# Patient Record
Sex: MALE | Race: WHITE | Employment: FULL TIME | URBAN - METROPOLITAN AREA
[De-identification: names, ages, dates, MRNs, and addresses within clinical notes are randomized per-mention and may not be internally consistent; named-entity substitution may affect disease eponyms.]

---

## 2017-02-18 ENCOUNTER — RADIANT APPOINTMENT (OUTPATIENT)
Dept: GENERAL RADIOLOGY | Facility: CLINIC | Age: 42
End: 2017-02-18
Attending: INTERNAL MEDICINE
Payer: COMMERCIAL

## 2017-02-18 ENCOUNTER — OFFICE VISIT (OUTPATIENT)
Dept: URGENT CARE | Facility: URGENT CARE | Age: 42
End: 2017-02-18
Payer: COMMERCIAL

## 2017-02-18 VITALS
BODY MASS INDEX: 34.72 KG/M2 | DIASTOLIC BLOOD PRESSURE: 98 MMHG | TEMPERATURE: 98.9 F | HEIGHT: 71 IN | WEIGHT: 248 LBS | OXYGEN SATURATION: 95 % | HEART RATE: 92 BPM | SYSTOLIC BLOOD PRESSURE: 140 MMHG

## 2017-02-18 DIAGNOSIS — R05.9 COUGH: ICD-10-CM

## 2017-02-18 DIAGNOSIS — R05.9 COUGH: Primary | ICD-10-CM

## 2017-02-18 LAB
FLUAV+FLUBV AG SPEC QL: NORMAL
FLUAV+FLUBV AG SPEC QL: NORMAL
SPECIMEN SOURCE: NORMAL

## 2017-02-18 PROCEDURE — 87804 INFLUENZA ASSAY W/OPTIC: CPT | Performed by: INTERNAL MEDICINE

## 2017-02-18 PROCEDURE — 99213 OFFICE O/P EST LOW 20 MIN: CPT | Performed by: INTERNAL MEDICINE

## 2017-02-18 PROCEDURE — 71020 XR CHEST 2 VW: CPT

## 2017-02-18 RX ORDER — DOXYCYCLINE 100 MG/1
100 CAPSULE ORAL 2 TIMES DAILY
Qty: 20 CAPSULE | Refills: 0 | Status: SHIPPED | OUTPATIENT
Start: 2017-02-18 | End: 2017-07-17

## 2017-02-18 NOTE — MR AVS SNAPSHOT
"              After Visit Summary   2/18/2017    Wilfrid Vickers    MRN: [id number]           Patient Information     Date Of Birth          1975        Visit Information        Provider Department      2/18/2017 5:15 PM Tomas Calvillo MD [facility]        Today's Diagnoses     Cough    -  1       Follow-ups after your visit        Who to contact     If you have questions or need follow up information about today's clinic visit or your schedule please contact [facility] directly at [phone].  Normal or non-critical lab and imaging results will be communicated to you by DHgatehart, letter or phone within 4 business days after the clinic has received the results. If you do not hear from us within 7 days, please contact the clinic through Nordic Rivert or phone. If you have a critical or abnormal lab result, we will notify you by phone as soon as possible.  Submit refill requests through PartyLine or call your pharmacy and they will forward the refill request to us. Please allow 3 business days for your refill to be completed.          Additional Information About Your Visit        MyChart Information     PartyLine gives you secure access to your electronic health record. If you see a primary care provider, you can also send messages to your care team and make appointments. If you have questions, please call your primary care clinic.  If you do not have a primary care provider, please call [phone] and they will assist you.        Care EveryWhere ID     This is your Care EveryWhere ID. This could be used by other organizations to access your [place] medical records  [id number]        Your Vitals Were     Pulse Temperature Height Pulse Oximetry BMI (Body Mass Index)       92 98.9  F (37.2  C) (Oral) 5' 10.5\" (1.791 m) 95% 35.08 kg/m2        Blood Pressure from Last 3 Encounters:   02/18/17 (!) 140/98   09/22/16 (!) 160/106   09/15/16 136/86    Weight from Last 3 " Encounters:   02/18/17 248 lb (112.5 kg)   09/22/16 242 lb (109.8 kg)   09/15/16 247 lb 8 oz (112.3 kg)              We Performed the Following     Influenza A/B antigen          Today's Medication Changes          These changes are accurate as of: 2/18/17 11:59 PM.  If you have any questions, ask your nurse or doctor.               Start taking these medicines.        Dose/Directions    doxycycline 100 MG capsule   Commonly known as:  VIBRAMYCIN   Used for:  Cough   Started by:  Tomas Calvillo MD        Dose:  100 mg   Take 1 capsule (100 mg) by mouth 2 times daily   Quantity:  20 capsule   Refills:  0            Where to get your medicines      These medications were sent to [facility] [address] [place] [address] AT [address] [address]    Hours:  24-hours Phone:  [phone]     doxycycline 100 MG capsule                Primary Care Provider Office Phone # Fax #    Jac Donta Brewster [phone]       [facility] [address] [facility] [address]        Thank you!     Thank you for choosing [facility]  for your care. Our goal is always to provide you with excellent care. Hearing back from our patients is one way we can continue to improve our services. Please take a few minutes to complete the written survey that you may receive in the mail after your visit with us. Thank you!             Your Updated Medication List - Protect others around you: Learn how to safely use, store and throw away your medicines at www.disposemymeds.org.          This list is accurate as of: 2/18/17 11:59 PM.  Always use your most recent med list.                   Brand Name Dispense Instructions for use    dextromethorphan 15 MG/5ML syrup      Take 10 mLs by mouth 4 times daily as needed for cough       doxycycline 100 MG capsule    VIBRAMYCIN    20 capsule    Take 1 capsule (100 mg) by  mouth 2 times daily       zolpidem 10 MG tablet    AMBIEN    1 tablet    Take tablet by mouth 15 minutes prior to sleep, for Sleep Study

## 2017-02-18 NOTE — NURSING NOTE
"Chief Complaint   Patient presents with     Urgent Care     Cough     Hx of frequent pneumonia when he was young.  5 days ago started to have body aches and congested nonproductive cough, now hearing wheezing and gurgling in his lungs, cough keeping him awake at night.  Reports diaphragm hurts because he's coughing so hard.  Today has felt feverish, had chills/sweats.     Initial BP (!) 140/98 (BP Location: Left arm, Cuff Size: Adult Large)  Pulse 92  Temp 98.9  F (37.2  C) (Oral)  Ht 5' 10.5\" (1.791 m)  Wt 248 lb (112.5 kg)  SpO2 95%  BMI 35.08 kg/m2 Estimated body mass index is 35.08 kg/(m^2) as calculated from the following:    Height as of this encounter: 5' 10.5\" (1.791 m).    Weight as of this encounter: 248 lb (112.5 kg)..  BP completed using cuff size: large  GLORIA Espitia  "

## 2017-02-21 NOTE — PROGRESS NOTES
"[facility]gent Care Progress Note        Tomas Calvillo MD, MPH  2-        History:      Wilfrid Vickers is a pleasant 41 year old male with a chief complaint of cough w scant amount of yellowish sputum and body aches x 5 days.   No dyspnea or chest pain.   No smoking history.   No headache or neck pain.  No GI or  symptoms.   No rash         Assessment and Plan:          - Influenza A/B antigen: negative  - XR Chest 2 Views: right upper lobe infiltrate.  Right upper lobe pneumonia:  - doxycycline (VIBRAMYCIN) 100 MG capsule; Take 1 capsule (100 mg) by mouth 2 times daily  Dispense: 20 capsule; Refill: 0  Discussed the adverse effects of this medication such as pigmentary changes of skin and lip and oral mucosa and advised to drink plenty of fluids and avoid laying down for an hour after ingesting doxycycline due to esophageal irritation that may ensue.  Discussed supportive care with the patient.  Advised to increase fluid intake and rest.  Tylenol 650 mg po for pain and fever q 6 hours prn  F/u w PCP in 1 week, earlier if symptoms worsen.  Discussed the need for follow up CXR in his PCP office to make sure that pneumonia has resolved.                   Physical Exam:      BP (!) 140/98 (BP Location: Left arm, Cuff Size: Adult Large)  Pulse 92  Temp 98.9  F (37.2  C) (Oral)  Ht 5' 10.5\" (1.791 m)  Wt 248 lb (112.5 kg)  SpO2 95%  BMI 35.08 kg/m2     Constitutional: Patient is in mild distress The patient is pleasant and cooperative.   HEENT: Head:  Head is atraumatic, normocephalic.    Eyes: Pupils are equal, round and reactive to light and accomodation.  Sclera is non-icteric. No conjunctival injection, or exudate noted. Extraocular motion is intact. Visual acuity is intact bilaterally.  Ears:  External acoustic canals are patent and clear.  There is no erythema and bulging( exudate)  of the ( R/L ) tympanic membrane(s ).   Nose:   Nasal congestion w/o drainage or mucosal ulceration " is noted.  Throat:  Oral mucosa is moist.  No oral lesions are noted.   posterior pharyngeal hyperemia w/o exudate noted.     Neck Supple.  There is no cervical lymphadenopathy.  No nuchal rigidity noted.  There is no meningismus.     Cardiovascular: Heart is regular to rate and rhythm.  No murmur is noted.     Lungs: Crackle in right upper lung posteriorly. No respiratory distress or compromise. No wheezing noted on exam today.   Abdomen: Soft and non-tender.    Back No flank tenderness is noted.   Extremeties No edema, no calf tenderness.   Neuro: No focal deficit.   Skin No petechiae or purpura is noted.  There is no rash.   Mood Normal              Data:      All new lab and imaging data was reviewed.   Results for orders placed or performed in visit on 02/18/17   Influenza A/B antigen   Result Value Ref Range    Influenza A/B Agn Specimen Nasopharyngeal     Influenza A  NEG     Negative   Test results must be correlated with clinical data. If necessary, results   should be confirmed by a molecular assay or viral culture.      Influenza B  NEG     Negative   Test results must be correlated with clinical data. If necessary, results   should be confirmed by a molecular assay or viral culture.

## 2017-03-03 ENCOUNTER — TELEPHONE (OUTPATIENT)
Dept: NURSING | Facility: CLINIC | Age: 42
End: 2017-03-03

## 2017-03-03 ENCOUNTER — TELEPHONE (OUTPATIENT)
Dept: FAMILY MEDICINE | Facility: CLINIC | Age: 42
End: 2017-03-03

## 2017-03-03 DIAGNOSIS — R05.9 COUGH: ICD-10-CM

## 2017-03-03 RX ORDER — DOXYCYCLINE 100 MG/1
100 CAPSULE ORAL 2 TIMES DAILY
Qty: 20 CAPSULE | Refills: 0 | Status: CANCELLED | OUTPATIENT
Start: 2017-03-03

## 2017-03-03 NOTE — TELEPHONE ENCOUNTER
Call Type: Triage Call    Presenting Problem: weeks ago sick UC pneumonia and chest hurted  alot.  Took along time to get over feel self getting sick again,  needing another course.  I need refill of antibioitcs, tranferred to  RN at [facility].  pt was unhappy with 24 hour turn around time  of EPIC note so transferred pt to live Rn in clin.  Triage Note:  Guideline Title: Medication Questions - Adult  Recommended Disposition: Call Dispensing Pharmacy or Provider  Immediately  Original Inclination: Wanted to speak with a nurse  Override Disposition:  Intended Action: Follow advice given  Physician Contacted: No  Requests refill of prescribed medication that does NOT have a valid refill; lack  of medication may cause clinical risk to patient if not available. ?  YES  Sign(s) or symptom(s) associated with a diagnosed condition or with a new illness  ? NO  Prescription ordered today and not available at pharmacy putting patient at  clinical risk ? NO  Recurrence of a symptom(s) or illness post prescribed medication treatment AND  provider instructed patient to call if symptom(s) returned. ? NO  Unable to obtain prescribed medication related to available resources AND  situation poses immediate clinical risk ? NO  Pharmacy calling to clarify prescription order. ? NO  Physician Instructions:  Care Advice: Have pharmacy phone number and prescription information  available when you speak with provider.  Ask pharmacist if they can do a partial refill.

## 2017-03-04 NOTE — TELEPHONE ENCOUNTER
"Call Type: Triage Call    Presenting Problem: Note in EPIC from today:  Was seen in urgent care  Xray showed pneumonia  If not improving, needs visit to be  re-evaluated  May need a change in antibiotic  Tiffanie Martin MD  note from Tiffanie Martin  \"Pt very upset that he could not get a  refill over the phone, he was yelling and saying I was calling him a  liar.  I know she thinks she a doctor but she doesn't know more than  me.\"  Triage Note:  Guideline Title: Breathing Problems  Recommended Disposition: See ED Immediately  Original Inclination: Would have called clinic  Override Disposition:  Intended Action: Patient does not know  Physician Contacted: No  New or worsening shortness of breath/difficulty breathing AND new onset of  fatigue, weakness, confusion, or change in ability to care out daily activities ?  YES  New or worsening signs and symptoms that may indicate shock ? NO  Diabetes and breathing problems ? NO  Cough without breathing difficulty ? NO  Not breathing (no air movement from nose/mouth; no chest or abdomen movement) ? NO  Sudden onset of severe breathing difficulty ? NO  Known or suspected ingestion or inhalation of foreign object (grasping at throat,  unable to speak, high pitched noise when breathing in, unable to swallow) AND  object still lodged ? NO  Following blunt trauma or penetrating wound to chest, throat, neck or abdomen OR  change in shape of chest wall ? NO  New or worsening shortness of breath/difficulty breathing AND any other cardiac  signs/symptoms for more than 5 minutes, now or within last hour ? NO  Coughing, wheezing or shortness of breath continues after foreign body is cleared  (expelled) from airway ? NO  Currently having difficulty breathing after near drowning ? NO  Difficulty swallowing ? NO  Signs and symptoms of anaphylaxis ? NO  Physician Instructions:  Care Advice: Another adult should drive.  Write down provider's name. List or place the following in a bag " for  transport with the patient: current prescription and/or nonprescription  medications  alternative treatments, therapies and medications  and street drugs.  Place person in a position of comfort and loosen tight clothing.  An adult should stay with the patient, preferably one trained in CPR. If  the person is not trained in CPR, then he or she should provide hands-only  (compression-only) CPR as recommended by the American Heart Association.

## 2017-03-04 NOTE — TELEPHONE ENCOUNTER
"Call Type: Triage Call    Presenting Problem: Call  wants to talk to nurse \"who hung up on me\"  Advised  caller I would review EPIC and assist;  began a tirade that  he is not getting what he needs and wants regarding more antibiotics  for his pneumonia.  Reviewed EPIC  EMR and last provider notation  regarding his request.\"Was seen in urgent care  Xray showed  pneumonia  If not improving, needs visit to be re-evaluated  May  need a change in antibiotic  Tiffanie Martin MD    Offered to  contact on call provider and present request to that provider but  would not guarantee that provider would jeramy his request.  Did not  want to do this.  Continued tirade stating that  everyone he spoke  with was \"stupid\" and did not know what he knows and that [facility]  was after his money by making him be seen again.  Multiple attempts  made to explain need for  evaluation per previous triage encounters  and was unable to redirect. Tirade became loud and verbally abusive  and so call was terminated without notification to caller.  Triage Note:  Guideline Title: Information Only Call; No Symptom Triage (Adult)  Recommended Disposition: Provide Information or Advice Only  Original Inclination:  Override Disposition:  Intended Action:  Physician Contacted: No  Follow-up call to recent contact; no triage required. Information provided from  past call documentation, approved references or experience. ?  YES  Requesting regular office appointment ? NO  Sign(s) or symptom(s) associated with a diagnosed condition or with a new illness  ? NO  Requesting information about provider, services or community resources ? NO  Call back to complete assessment/clarification of information from prior caller to  complete triage ? NO  Requesting information and provider is best resource; no triage required. ? NO  Caller not with patient and is unable to provide clinical information about  patient to facilitate triage. ? NO  Requesting provider " information for recently scheduled test, procedure; no triage  required. Needed information not available per approved resources or clinical  experience. ? NO  Requesting information not available per approved reference or clinical  experience; no triage required. ? NO  Requesting information regarding scheduled exam, test or procedure; no triage  required. Information provided from approved resources or clinical experience. ?  NO  General information question; no triage required. Information provided from  approved references or knowledge of organization. ? NO  Health information question; person denies any symptoms, no triage required.  Information provided from approved references or clinical experience. ? NO  Physician Instructions:  Care Advice:

## 2017-06-06 ENCOUNTER — NURSE TRIAGE (OUTPATIENT)
Dept: NURSING | Facility: CLINIC | Age: 42
End: 2017-06-06

## 2017-06-06 NOTE — TELEPHONE ENCOUNTER
"  Reason for Disposition    [1] Age > 40 AND [2] no obvious cause AND [3] pain even when not moving the arm    (Exception: pain is clearly made worse by moving arm or bending neck)    Additional Information    Negative: Shock suspected (e.g., cold/pale/clammy skin, too weak to stand, low BP, rapid pulse)    Negative: [1] Similar pain previously AND [2] it was from \"heart attack\"    Negative: [1] Similar pain previously AND [2] it was from \"angina\" AND [3] not relieved by nitroglycerin    Negative: Sounds like a life-threatening emergency to the triager    Negative: Followed an arm injury    Negative: Chest pain    Negative: Pain, redness, or swelling at intravenous (IV) site or along course of vein    Negative: Wound looks infected    Negative: Elbow pain is main symptom    Negative: Wrist pain is main symptom    Negative: Difficulty breathing or unusual sweating (e.g., sweating without exertion)    Negative: [1] Age > 40 AND [2] associated chest or jaw pain AND [3] pain lasts > 5 minutes    Protocols used: ARM PAIN-ADULT-    "

## 2017-07-17 ENCOUNTER — OFFICE VISIT (OUTPATIENT)
Dept: FAMILY MEDICINE | Facility: CLINIC | Age: 42
End: 2017-07-17
Payer: COMMERCIAL

## 2017-07-17 VITALS
DIASTOLIC BLOOD PRESSURE: 100 MMHG | SYSTOLIC BLOOD PRESSURE: 144 MMHG | WEIGHT: 251 LBS | BODY MASS INDEX: 35.51 KG/M2 | OXYGEN SATURATION: 97 % | HEART RATE: 65 BPM | TEMPERATURE: 97.5 F

## 2017-07-17 DIAGNOSIS — L03.011 PARONYCHIA OF FINGER OF RIGHT HAND: Primary | ICD-10-CM

## 2017-07-17 DIAGNOSIS — R03.0 ELEVATED BLOOD PRESSURE READING WITHOUT DIAGNOSIS OF HYPERTENSION: ICD-10-CM

## 2017-07-17 PROCEDURE — 99214 OFFICE O/P EST MOD 30 MIN: CPT | Performed by: FAMILY MEDICINE

## 2017-07-17 RX ORDER — CIPROFLOXACIN 500 MG/1
500 TABLET, FILM COATED ORAL 2 TIMES DAILY
Qty: 20 TABLET | Refills: 0 | Status: SHIPPED | OUTPATIENT
Start: 2017-07-17 | End: 2017-07-28

## 2017-07-17 ASSESSMENT — PAIN SCALES - GENERAL: PAINLEVEL: SEVERE PAIN (7)

## 2017-07-17 NOTE — NURSING NOTE
"Chief Complaint   Patient presents with     Infection     right ring finger     Health Maintenance     lipid       Initial BP (!) 140/98 (BP Location: Left arm, Patient Position: Chair, Cuff Size: Adult Regular)  Pulse 65  Temp 97.5  F (36.4  C) (Oral)  Wt 251 lb (113.9 kg)  SpO2 97%  BMI 35.51 kg/m2 Estimated body mass index is 35.51 kg/(m^2) as calculated from the following:    Height as of 2/18/17: 5' 10.5\" (1.791 m).    Weight as of this encounter: 251 lb (113.9 kg).  Medication Reconciliation: complete   Luna See VERONICA Farrell      "

## 2017-07-17 NOTE — MR AVS SNAPSHOT
After Visit Summary   7/17/2017    Wilfrid Vickers    MRN: [id number]           Patient Information     Date Of Birth          1975        Visit Information        Provider Department      7/17/2017 11:00 AM Pepe Alva MD [facility]        Today's Diagnoses     Paronychia of finger of right hand    -  1    Elevated blood pressure reading without diagnosis of hypertension           Follow-ups after your visit        Follow-up notes from your care team     Return in about 3 months (around 10/17/2017) for BP Recheck.      Who to contact     If you have questions or need follow up information about today's clinic visit or your schedule please contact [facility] directly at [phone].  Normal or non-critical lab and imaging results will be communicated to you by Compressushart, letter or phone within 4 business days after the clinic has received the results. If you do not hear from us within 7 days, please contact the clinic through Compressushart or phone. If you have a critical or abnormal lab result, we will notify you by phone as soon as possible.  Submit refill requests through Nutraspace or call your pharmacy and they will forward the refill request to us. Please allow 3 business days for your refill to be completed.          Additional Information About Your Visit        MyChart Information     Nutraspace gives you secure access to your electronic health record. If you see a primary care provider, you can also send messages to your care team and make appointments. If you have questions, please call your primary care clinic.  If you do not have a primary care provider, please call [phone] and they will assist you.        Care EveryWhere ID     This is your Care EveryWhere ID. This could be used by other organizations to access your [place] medical records  [id number]        Your Vitals Were     Pulse Temperature Pulse Oximetry BMI (Body Mass  Index)          65 97.5  F (36.4  C) (Oral) 97% 35.51 kg/m2         Blood Pressure from Last 3 Encounters:   07/17/17 (!) 144/100   02/18/17 (!) 140/98   09/22/16 (!) 160/106    Weight from Last 3 Encounters:   07/17/17 251 lb (113.9 kg)   02/18/17 248 lb (112.5 kg)   09/22/16 242 lb (109.8 kg)              Today, you had the following     No orders found for display         Today's Medication Changes          These changes are accurate as of: 7/17/17 11:48 AM.  If you have any questions, ask your nurse or doctor.               Start taking these medicines.        Dose/Directions    ciprofloxacin 500 MG tablet   Commonly known as:  CIPRO   Used for:  Paronychia of finger of right hand   Started by:  Pepe Alva MD        Dose:  500 mg   Take 1 tablet (500 mg) by mouth 2 times daily   Quantity:  20 tablet   Refills:  0            Where to get your medicines      These medications were sent to [facility] [id number] [place] [address] [address] AT [facility] OF [address]  [address] [facility] [address]     Phone:  [phone]     ciprofloxacin 500 MG tablet                Primary Care Provider Office Phone # Fax #    Jac Donta Brewster [phone]       [facility] [address] [facility] [address]        Equal Access to Services     PERCY ROMERO AH: Hadii dayna rodgers hadasho Soomaali, waaxda luqadaha, qaybta kaalmada adeegyada, waxay jose alejandro santiago adesteve winn. So [facility] [phone].    ATENCIÓN: Si habla español, tiene a slade disposición servicios gratuitos de asistencia lingüística. Llame al [phone].    We comply with applicable federal civil rights laws and [place] laws. We do not discriminate on the basis of race, color, national origin, age, disability sex, sexual orientation or gender identity.            Thank you!     Thank you for choosing [facility]  for your care. Our goal is always to provide you  with excellent care. Hearing back from our patients is one way we can continue to improve our services. Please take a few minutes to complete the written survey that you may receive in the mail after your visit with us. Thank you!             Your Updated Medication List - Protect others around you: Learn how to safely use, store and throw away your medicines at www.disposemymeds.org.          This list is accurate as of: 7/17/17 11:48 AM.  Always use your most recent med list.                   Brand Name Dispense Instructions for use Diagnosis    ciprofloxacin 500 MG tablet    CIPRO    20 tablet    Take 1 tablet (500 mg) by mouth 2 times daily    Paronychia of finger of right hand

## 2017-07-17 NOTE — PROGRESS NOTES
SUBJECTIVE:                                                    Wilfrid Vickers is a 42 year old male who presents to clinic today for the following health issues:      Patient is here for a possible infection on his right ring finger. Patient had an ingrown nail about a week ago and he clipped it which made pus came out and now it is hurting.    Pain has been increasing   He now has pain in the pulp of the finger     O:BP (!) 144/100  Pulse 65  Temp 97.5  F (36.4  C) (Oral)  Wt 251 lb (113.9 kg)  SpO2 97%  BMI 35.51 kg/m2      No adenopathy     No lymphadenitis   No source to drain paronychia along the cuticle   Swelling and pain seem to deep in the pulp   Tender, but normal color and warmth     .Chest wall normal to inspection and palpation. Good excursion bilaterally. Lungs clear to auscultation. Good air movement bilaterally without rales, wheezes, or rhonchi.   Regular rate and  rhythm. S1 and S2 normal, no murmurs, clicks, gallops or rubs. No edema or JVD.      ICD-10-CM    1. Paronychia of finger of right hand L03.011 ciprofloxacin (CIPRO) 500 MG tablet   2. Elevated blood pressure reading without diagnosis of hypertension R03.0      Recheck bp in 3 mos   Discussed weight loss, salt reduction and increased exercise   If not improved start on antihypertensive med     Patient has swelling and redness of the ring finger on the right       Reviewed and updated as needed this visit by clinical staff  Tobacco  Allergies  Meds  Med Hx  Surg Hx  Fam Hx  Soc Hx      Reviewed and updated as needed this visit by Provider       \

## 2017-07-19 ENCOUNTER — TELEPHONE (OUTPATIENT)
Dept: FAMILY MEDICINE | Facility: CLINIC | Age: 42
End: 2017-07-19

## 2017-07-19 NOTE — TELEPHONE ENCOUNTER
Reason for Call:  Other     Detailed comments: Patient would like to discuss his infected finger that he had been seen for.    Phone Number Patient can be reached at: Home number on file [phone] (home)    Best Time:     Can we leave a detailed message on this number? Not Applicable    Call taken on 7/19/2017 at 9:50 AM by Cinda Yan

## 2017-07-19 NOTE — TELEPHONE ENCOUNTER
Called patient.  He was seen by Dr. Alva on 7/17/17 for an infected finger.  He has been on antibiotics now for a couple of days.  He stated that last night there were to large pockets noted they were causing him severe pain and pressure in his finger.  He stated that he could not handle the pain anymore, so he heated up a needle and cleaned it with hydrogen peroxide and then put a couple of pokes in the area.  He stated that it drained a lot of yellow pus out of his finger, which caused his pain to go down.  He is wondering if he should get his finger looked at again as he is afraid that the area will fill up with pus again.  He is using a bandage over the area at this point.    His finger is still red but the pain went down after he drained it. He denies any increased redness, pain, fevers, or chills.    Routed to covering provider to please advise on above.    Cris Marie RN [facility] Triage.

## 2017-07-19 NOTE — TELEPHONE ENCOUNTER
Notified patient of the message below per provider.  Appointment made for tomorrow for a recheck.  Advised patient if redness, increased pain and pus, fevers or chills to UC or ER today.  Cris Marie RN [facility] Triage.

## 2017-07-19 NOTE — TELEPHONE ENCOUNTER
If he can wait see Dr. DAY tomorrow or get seen at  if it is painful and draining pus.     Patsy Graham MD.   Family Physician.  [facility].

## 2017-07-20 ENCOUNTER — OFFICE VISIT (OUTPATIENT)
Dept: FAMILY MEDICINE | Facility: CLINIC | Age: 42
End: 2017-07-20
Payer: COMMERCIAL

## 2017-07-20 VITALS — WEIGHT: 251 LBS | BODY MASS INDEX: 35.51 KG/M2 | OXYGEN SATURATION: 98 % | HEART RATE: 67 BPM | TEMPERATURE: 97.6 F

## 2017-07-20 DIAGNOSIS — L03.011 PARONYCHIA OF FINGER, RIGHT: Primary | ICD-10-CM

## 2017-07-20 DIAGNOSIS — R03.0 ELEVATED BLOOD PRESSURE READING WITHOUT DIAGNOSIS OF HYPERTENSION: ICD-10-CM

## 2017-07-20 PROCEDURE — 99213 OFFICE O/P EST LOW 20 MIN: CPT | Performed by: FAMILY MEDICINE

## 2017-07-20 NOTE — MR AVS SNAPSHOT
After Visit Summary   7/20/2017    Wilfrid Vickers    MRN: [id number]           Patient Information     Date Of Birth          1975        Visit Information        Provider Department      7/20/2017 11:20 AM Pepe Alva MD [facility]         Follow-ups after your visit        Your next 10 appointments already scheduled     Oct 16, 2017  1:20 PM CDT   SHORT with Pepe Alva MD   [facility] ([facility]    [address] [place] [address] [phone]              Who to contact     If you have questions or need follow up information about today's clinic visit or your schedule please contact [facility] directly at [phone].  Normal or non-critical lab and imaging results will be communicated to you by MyChart, letter or phone within 4 business days after the clinic has received the results. If you do not hear from us within 7 days, please contact the clinic through MyChart or phone. If you have a critical or abnormal lab result, we will notify you by phone as soon as possible.  Submit refill requests through [employer] or call your pharmacy and they will forward the refill request to us. Please allow 3 business days for your refill to be completed.          Additional Information About Your Visit        MyChart Information     [employer] gives you secure access to your electronic health record. If you see a primary care provider, you can also send messages to your care team and make appointments. If you have questions, please call your primary care clinic.  If you do not have a primary care provider, please call [phone] and they will assist you.        Care EveryWhere ID     This is your Care EveryWhere ID. This could be used by other organizations to access your [place] medical records  [id number]        Your Vitals Were     Pulse Temperature Pulse  Oximetry BMI (Body Mass Index)          67 97.6  F (36.4  C) (Oral) 98% 35.51 kg/m2         Blood Pressure from Last 3 Encounters:   07/20/17 (!) (P) 150/100   07/17/17 (!) 144/100   02/18/17 (!) 140/98    Weight from Last 3 Encounters:   07/20/17 251 lb (113.9 kg)   07/17/17 251 lb (113.9 kg)   02/18/17 248 lb (112.5 kg)              Today, you had the following     No orders found for display       Primary Care Provider Office Phone # Fax #    Jac Donta Brewster [phone]       [facility] [address] [facility] [address]        Equal Access to Services     PERCY ROMERO : Hadii dayna sabillono Soaracelis, waaxda luqadaha, qaybta kaalmada adeegyada, ro mills . So [facility] [phone].    ATENCIÓN: Si habla español, tiene a slade disposición servicios gratuitos de asistencia lingüística. Llame al [phone].    We comply with applicable federal civil rights laws and [place] laws. We do not discriminate on the basis of race, color, national origin, age, disability sex, sexual orientation or gender identity.            Thank you!     Thank you for choosing [facility]  for your care. Our goal is always to provide you with excellent care. Hearing back from our patients is one way we can continue to improve our services. Please take a few minutes to complete the written survey that you may receive in the mail after your visit with us. Thank you!             Your Updated Medication List - Protect others around you: Learn how to safely use, store and throw away your medicines at www.disposemymeds.org.          This list is accurate as of: 7/20/17 12:06 PM.  Always use your most recent med list.                   Brand Name Dispense Instructions for use Diagnosis    ciprofloxacin 500 MG tablet    CIPRO    20 tablet    Take 1 tablet (500 mg) by mouth 2 times daily    Paronychia of finger of right hand

## 2017-07-20 NOTE — PROGRESS NOTES
SUBJECTIVE:                                                    Wilfrid Vickers is a 42 year old male who presents to clinic today for the following health issues:    Recheck right finger infection    Problem list and histories reviewed & adjusted, as indicated.      Additional history: as documented    Reviewed note from the doctor on call   Patient punctured the wound from the side of the finger with drainage of a large amount of pus     Current Outpatient Prescriptions   Medication Sig Dispense Refill     ciprofloxacin (CIPRO) 500 MG tablet Take 1 tablet (500 mg) by mouth 2 times daily 20 tablet 0     Allergies   Allergen Reactions     Amoxicillin      Rash     Erythromycin      Vomiting       Reviewed and updated as needed this visit by clinical staff  Tobacco  Allergies  Meds  Med Hx  Surg Hx  Fam Hx  Soc Hx      Reviewed and updated as needed this visit by Provider      O: BP (!) (P) 150/100  Pulse 67  Temp 97.6  F (36.4  C) (Oral)  Wt 251 lb (113.9 kg)  SpO2 98%  BMI 35.51 kg/m2    Pulp of the finger no longer tender   Slightly scabbed area on the  Medial portion of the finger         ICD-10-CM    1. Paronychia of finger, right L03.011    2. Elevated blood pressure reading without diagnosis of hypertension R03.0     no change in therapy for the paronychia   Handout regarding blood pressure   Follow up 3 months   If not better consider start of medication.   Patient could be a candidate for the pgen program

## 2017-07-20 NOTE — NURSING NOTE
"Chief Complaint   Patient presents with     RECHECK     Right Ring Finger       Initial Pulse 67  Temp 97.6  F (36.4  C) (Oral)  Wt 251 lb (113.9 kg)  SpO2 98%  BMI 35.51 kg/m2 Estimated body mass index is 35.51 kg/(m^2) as calculated from the following:    Height as of 2/18/17: 5' 10.5\" (1.791 m).    Weight as of this encounter: 251 lb (113.9 kg).  Medication Reconciliation: brie Zarate MA      "

## 2017-07-21 ENCOUNTER — TELEPHONE (OUTPATIENT)
Dept: FAMILY MEDICINE | Facility: CLINIC | Age: 42
End: 2017-07-21

## 2017-07-21 NOTE — TELEPHONE ENCOUNTER
"Called patient: patient states \"I shouldn't have called you it isn't important\" \"I am much better than I was 3 days ago\", \"Just want to start soaking my finger again\"   Admits has a small pocket of pus left on his finger - states was already seen for this in the clinic and was told he was on the mend. Encouraged patient to call clinic if streaks appear, is suddenly unable to bend or move finger, if finger becomes black and/or cold, patient states understands and will call if needed.     Anni Guo RN  [facility]      "

## 2017-07-21 NOTE — TELEPHONE ENCOUNTER
Reason for call:  Patient reporting a symptom    Symptom or request: Finger     Duration (how long have symptoms been present): Couple of days    Have you been treated for this before? Yes    Additional comments: He is wanting to speak with a nurse to see if he should soak his finger. He said that he still has the pocket of puss and to get the puss out he thinks that he needs to soak the finger. Please give him a call to let him know what to do.     Phone Number patient can be reached at:  Home number on file [phone] (home)    Best Time:  Anytime    Can we leave a detailed message on this number:  YES    Call taken on 7/21/2017 at 1:46 PM by Adrianna Huerta

## 2017-07-28 ENCOUNTER — OFFICE VISIT (OUTPATIENT)
Dept: FAMILY MEDICINE | Facility: CLINIC | Age: 42
End: 2017-07-28
Payer: COMMERCIAL

## 2017-07-28 VITALS
TEMPERATURE: 98.1 F | DIASTOLIC BLOOD PRESSURE: 93 MMHG | OXYGEN SATURATION: 99 % | BODY MASS INDEX: 35.36 KG/M2 | HEART RATE: 71 BPM | WEIGHT: 250 LBS | SYSTOLIC BLOOD PRESSURE: 134 MMHG

## 2017-07-28 DIAGNOSIS — L03.012 PARONYCHIA OF FINGER, LEFT: Primary | ICD-10-CM

## 2017-07-28 DIAGNOSIS — F19.982 DRUG INDUCED INSOMNIA (H): ICD-10-CM

## 2017-07-28 PROCEDURE — 99213 OFFICE O/P EST LOW 20 MIN: CPT | Performed by: FAMILY MEDICINE

## 2017-07-28 NOTE — NURSING NOTE
"Chief Complaint   Patient presents with     Finger     Follow up right finger infection       Initial BP (!) 134/93  Pulse 71  Temp 98.1  F (36.7  C) (Oral)  Wt 250 lb (113.4 kg)  SpO2 99%  BMI 35.36 kg/m2 Estimated body mass index is 35.36 kg/(m^2) as calculated from the following:    Height as of 2/18/17: 5' 10.5\" (1.791 m).    Weight as of this encounter: 250 lb (113.4 kg).  Medication Reconciliation: complete   Yun Zarate MA      "

## 2017-07-28 NOTE — PROGRESS NOTES
"  SUBJECTIVE:                                                    Wilfrid Vickers is a 42 year old male who presents to clinic today for the following health issues:      Recheck right finger infection    He still had a pocket of pus present on Monday 4 days ago but now that is gone   He states he stayed on the antibiotic and now it seems like the infection is gone     Patient states he has been having trouble sleeping because he stopped marijuana   He was using a lot of it   He wants a sleeping pill to help him sleep for the next several days     He states he can get off of things without a problem   He had a history of methamphetamine abuse in the past and he stopped that (pt came in and talked to me about this when he saw this in his chart.  He disputes that he said this and since it was so long ago I do not have a specific memory of this conversation.  Patient states he has not taken methamphetamine.  I have no record of any methamphetamine use documented in the chart, so I would say that I would agree that this should not be considered part of his record; 2/9/2018)  He gave up marijuana for a year in the past     He says \"  I am not like other people.  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 No pertinent past medical history.    Past Surgical History:   Procedure Laterality Date     ARTHROPLASTY SHOULDER Left     torn labrum, rotator cuff repair       Family History   Problem Relation Age of Onset     CANCER Father      Prostate     Allergies Mother        Social History   Substance Use Topics     Smoking status: Never Smoker     Smokeless tobacco: Never Used     Alcohol use Yes      Comment: 6 pack per weekend twice monthly         O; BP (!) 134/93  Pulse 71  Temp 98.1  F (36.7  C) (Oral)  Wt 250 lb (113.4 kg)  SpO2 99%  BMI 35.36 kg/m2    No acute inflammation of the finger   Patient was tapping it with other finger without pain   He no pus "   The nail looks ok     A;  Paronychia resolved   Insomnia     P: though patient asked for sleeping pills I told him that I would not give them too him and that even though he does think he could get hooked on them, I think he is at risk for developing a dependence     I would say he is done with his antibiotic for now.

## 2017-07-28 NOTE — MR AVS SNAPSHOT
After Visit Summary   7/28/2017    Wilfrid Vickers    MRN: [id number]           Patient Information     Date Of Birth          1975        Visit Information        Provider Department      7/28/2017 3:00 PM Pepe Alva MD [facility]        Today's Diagnoses     Paronychia of finger, left    -  1    Drug induced insomnia (H)           Follow-ups after your visit        Your next 10 appointments already scheduled     Oct 16, 2017  1:20 PM CDT   SHORT with Pepe Alva MD   [facility] ([facility]    [address] [place] [id number] [phone]              Who to contact     If you have questions or need follow up information about today's clinic visit or your schedule please contact [facility] directly at [phone].  Normal or non-critical lab and imaging results will be communicated to you by MyChart, letter or phone within 4 business days after the clinic has received the results. If you do not hear from us within 7 days, please contact the clinic through MyChart or phone. If you have a critical or abnormal lab result, we will notify you by phone as soon as possible.  Submit refill requests through [employer] or call your pharmacy and they will forward the refill request to us. Please allow 3 business days for your refill to be completed.          Additional Information About Your Visit        MyChart Information     [employer] gives you secure access to your electronic health record. If you see a primary care provider, you can also send messages to your care team and make appointments. If you have questions, please call your primary care clinic.  If you do not have a primary care provider, please call [phone] and they will assist you.        Care EveryWhere ID     This is your Care EveryWhere ID. This could be used by other organizations to access  your [place] medical records  [id number]        Your Vitals Were     Pulse Temperature Pulse Oximetry BMI (Body Mass Index)          71 98.1  F (36.7  C) (Oral) 99% 35.36 kg/m2         Blood Pressure from Last 3 Encounters:   07/28/17 (!) 134/93   07/20/17 (!) (P) 150/100   07/17/17 (!) 144/100    Weight from Last 3 Encounters:   07/28/17 250 lb (113.4 kg)   07/20/17 251 lb (113.9 kg)   07/17/17 251 lb (113.9 kg)              Today, you had the following     No orders found for display         Today's Medication Changes          These changes are accurate as of: 7/28/17  3:41 PM.  If you have any questions, ask your nurse or doctor.               Stop taking these medicines if you haven't already. Please contact your care team if you have questions.     ciprofloxacin 500 MG tablet   Commonly known as:  CIPRO   Stopped by:  Pepe Alva MD                    Primary Care Provider Office Phone # Fax #    Jac Donta Brewster [phone]       [facility] [address] [place] [address]        Equal Access to Services     PERCY ROMERO AH: Hadii dayna ku hadasho Soomaali, waaxda luqadaha, qaybta kaalmada adeegyada, ro blount haypaul winn. So [facility] [phone].    ATENCIÓN: Si habla español, tiene a slade disposición servicios gratuitos de asistencia lingüística. Llame al [phone].    We comply with applicable federal civil rights laws and [place] laws. We do not discriminate on the basis of race, color, national origin, age, disability sex, sexual orientation or gender identity.            Thank you!     Thank you for choosing [facility]  for your care. Our goal is always to provide you with excellent care. Hearing back from our patients is one way we can continue to improve our services. Please take a few minutes to complete the written survey that you may receive in the mail after your visit with us. Thank you!              Your Updated Medication List - Protect others around you: Learn how to safely use, store and throw away your medicines at www.disposemymeds.org.      Notice  As of 7/28/2017  3:41 PM    You have not been prescribed any medications.

## 2017-11-28 ENCOUNTER — OFFICE VISIT (OUTPATIENT)
Dept: FAMILY MEDICINE | Facility: CLINIC | Age: 42
End: 2017-11-28
Payer: COMMERCIAL

## 2017-11-28 VITALS
HEART RATE: 80 BPM | OXYGEN SATURATION: 96 % | RESPIRATION RATE: 16 BRPM | HEIGHT: 71 IN | TEMPERATURE: 96.9 F | WEIGHT: 249 LBS | SYSTOLIC BLOOD PRESSURE: 146 MMHG | BODY MASS INDEX: 34.86 KG/M2 | DIASTOLIC BLOOD PRESSURE: 86 MMHG

## 2017-11-28 DIAGNOSIS — I49.3 PVC'S (PREMATURE VENTRICULAR CONTRACTIONS): ICD-10-CM

## 2017-11-28 DIAGNOSIS — I10 BENIGN ESSENTIAL HYPERTENSION: ICD-10-CM

## 2017-11-28 DIAGNOSIS — R42 LIGHTHEADEDNESS: Primary | ICD-10-CM

## 2017-11-28 LAB
BASOPHILS # BLD AUTO: 0.1 10E9/L (ref 0–0.2)
BASOPHILS NFR BLD AUTO: 0.6 %
DIFFERENTIAL METHOD BLD: ABNORMAL
EOSINOPHIL # BLD AUTO: 0.4 10E9/L (ref 0–0.7)
EOSINOPHIL NFR BLD AUTO: 3.5 %
ERYTHROCYTE [DISTWIDTH] IN BLOOD BY AUTOMATED COUNT: 12.7 % (ref 10–15)
HCT VFR BLD AUTO: 47.9 % (ref 40–53)
HGB BLD-MCNC: 16.7 G/DL (ref 13.3–17.7)
LYMPHOCYTES # BLD AUTO: 3.1 10E9/L (ref 0.8–5.3)
LYMPHOCYTES NFR BLD AUTO: 24.5 %
MCH RBC QN AUTO: 32.5 PG (ref 26.5–33)
MCHC RBC AUTO-ENTMCNC: 34.9 G/DL (ref 31.5–36.5)
MCV RBC AUTO: 93 FL (ref 78–100)
MONOCYTES # BLD AUTO: 1.2 10E9/L (ref 0–1.3)
MONOCYTES NFR BLD AUTO: 9.3 %
NEUTROPHILS # BLD AUTO: 7.9 10E9/L (ref 1.6–8.3)
NEUTROPHILS NFR BLD AUTO: 62.1 %
PLATELET # BLD AUTO: 272 10E9/L (ref 150–450)
RBC # BLD AUTO: 5.14 10E12/L (ref 4.4–5.9)
WBC # BLD AUTO: 12.8 10E9/L (ref 4–11)

## 2017-11-28 PROCEDURE — 36415 COLL VENOUS BLD VENIPUNCTURE: CPT | Performed by: FAMILY MEDICINE

## 2017-11-28 PROCEDURE — 80061 LIPID PANEL: CPT | Performed by: FAMILY MEDICINE

## 2017-11-28 PROCEDURE — 99214 OFFICE O/P EST MOD 30 MIN: CPT | Performed by: FAMILY MEDICINE

## 2017-11-28 PROCEDURE — 80050 GENERAL HEALTH PANEL: CPT | Performed by: FAMILY MEDICINE

## 2017-11-28 RX ORDER — METOPROLOL SUCCINATE 25 MG/1
25 TABLET, EXTENDED RELEASE ORAL DAILY
Qty: 30 TABLET | Refills: 6 | Status: SHIPPED | OUTPATIENT
Start: 2017-11-28 | End: 2018-02-03

## 2017-11-28 NOTE — NURSING NOTE
"Chief Complaint   Patient presents with     multiple issues     PVC's, weight loss and high BP.     Musculoskeletal Problem     finger injury       Initial /86  Pulse 80  Temp 96.9  F (36.1  C) (Oral)  Resp 16  Ht 5' 10.5\" (1.791 m)  Wt 249 lb (112.9 kg)  SpO2 96%  BMI 35.22 kg/m2 Estimated body mass index is 35.22 kg/(m^2) as calculated from the following:    Height as of this encounter: 5' 10.5\" (1.791 m).    Weight as of this encounter: 249 lb (112.9 kg).  Medication Reconciliation: complete  "

## 2017-11-28 NOTE — MR AVS SNAPSHOT
"              After Visit Summary   11/28/2017    Wilfrid Vickers    MRN: [id number]           Patient Information     Date Of Birth          1975        Visit Information        Provider Department      11/28/2017 3:30 PM Jac Brewster MD [facility]        Today's Diagnoses     Lightheadedness    -  1    PVC's (premature ventricular contractions)        Benign essential hypertension           Follow-ups after your visit        Who to contact     If you have questions or need follow up information about today's clinic visit or your schedule please contact [facility] directly at [phone].  Normal or non-critical lab and imaging results will be communicated to you by [employer], letter or phone within 4 business days after the clinic has received the results. If you do not hear from us within 7 days, please contact the clinic through [employer] or phone. If you have a critical or abnormal lab result, we will notify you by phone as soon as possible.  Submit refill requests through [employer] or call your pharmacy and they will forward the refill request to us. Please allow 3 business days for your refill to be completed.          Additional Information About Your Visit        MyChart Information     [employer] gives you secure access to your electronic health record. If you see a primary care provider, you can also send messages to your care team and make appointments. If you have questions, please call your primary care clinic.  If you do not have a primary care provider, please call [phone] and they will assist you.        Care EveryWhere ID     This is your Care EveryWhere ID. This could be used by other organizations to access your [place] medical records  [id number]        Your Vitals Were     Pulse Temperature Respirations Height Pulse Oximetry BMI (Body Mass Index)    80 96.9  F (36.1  C) (Oral) 16 5' 10.5\" (1.791 m) 96% 35.22 kg/m2       Blood Pressure from Last 3 " Encounters:   11/28/17 146/86   07/28/17 (!) 134/93   07/20/17 (!) (P) 150/100    Weight from Last 3 Encounters:   11/28/17 249 lb (112.9 kg)   07/28/17 250 lb (113.4 kg)   07/20/17 251 lb (113.9 kg)              We Performed the Following     CBC with platelets differential     Comprehensive metabolic panel     Lipid panel reflex to direct LDL Non-fasting     TSH with free T4 reflex          Today's Medication Changes          These changes are accurate as of: 11/28/17 11:59 PM.  If you have any questions, ask your nurse or doctor.               Start taking these medicines.        Dose/Directions    metoprolol 25 MG 24 hr tablet   Commonly known as:  TOPROL-XL   Used for:  PVC's (premature ventricular contractions), Benign essential hypertension   Started by:  Jac Brewster MD        Dose:  25 mg   Take 1 tablet (25 mg) by mouth daily   Quantity:  30 tablet   Refills:  6            Where to get your medicines      These medications were sent to [facility] [address] [facility] [address] [place] [address] [address]VE [address] [facility] [address]  [address] [facility] [address]     Phone:  [phone]     metoprolol 25 MG 24 hr tablet                Primary Care Provider Office Phone # Fax #    Jac Donta Brewster [phone] [address] [facility] [address]        Equal Access to Services     L[facility]IE [facility] AH: Hadii dayna rodgers hadasho Soaracelis, waaxda luqadaha, qaybta kaalmada adeegyada, ro mills . So [facility] [phone].    ATENCIÓN: Si habla español, tiene a slade disposición servicios gratuitos de asistencia lingüística. Llame al [phone].    We comply with applicable federal civil rights laws and [place] laws. We do not discriminate on the basis of race, color, national origin, age, disability, sex, sexual orientation, or gender identity.            Thank you!     Thank you for choosing [facility]  for your care. Our  goal is always to provide you with excellent care. Hearing back from our patients is one way we can continue to improve our services. Please take a few minutes to complete the written survey that you may receive in the mail after your visit with us. Thank you!             Your Updated Medication List - Protect others around you: Learn how to safely use, store and throw away your medicines at www.disposemymeds.org.          This list is accurate as of: 11/28/17 11:59 PM.  Always use your most recent med list.                   Brand Name Dispense Instructions for use Diagnosis    MELATONIN PO           metoprolol 25 MG 24 hr tablet    TOPROL-XL    30 tablet    Take 1 tablet (25 mg) by mouth daily    PVC's (premature ventricular contractions), Benign essential hypertension

## 2017-11-28 NOTE — PROGRESS NOTES
SUBJECTIVE:   Wilfrid Vickers is a 42 year old male who presents to clinic today for the following health issues:      Multiple issues, PVC's, Blood pressure, head pain and dizzy spells      Duration: 2 months    Intensity:  mild      History (similar episodes/previous evaluation): 1 year ago, EKG as an evaluation    Precipitating or alleviating factors: drinking, smoking pot, makes it worse    Therapies tried and outcome: None     Had an echocardiogram, 55%.  Was told this was OK    Has HTN, developed over the last couple of years    Had a recent episode where he was laying down to go to sleep, feels a flutter, and then beat really strong  Sometimes they get really frequent  Sometimes it is 20% of his heartbeats just by counting    BP Readings from Last 6 Encounters:   11/28/17 146/86   07/28/17 (!) 134/93   07/20/17 (!) (P) 150/100   07/17/17 (!) 144/100   02/18/17 (!) 140/98   09/22/16 (!) 160/106       ROS: As per HPI.  Constitutional: no recent illness, no fevers/sweats/chills  Resp: no shortness of breath, wheezing, or cough.  Psych: + anxiety  Neuro: no new headaches, dizziness, numbness, or tingling    I have reviewed and updated the patient's past medical history in the EMR. Current problems are:    Patient Active Problem List   Diagnosis     CARDIOVASCULAR SCREENING; LDL GOAL LESS THAN 160     Labral tear of shoulder     Recurrent shoulder dislocation     Phobia, flying     Elevated blood pressure reading without diagnosis of hypertension     Benign essential hypertension     PVC's (premature ventricular contractions)     Past Surgical History:   Procedure Laterality Date     ARTHROPLASTY SHOULDER Left     torn labrum, rotator cuff repair       Social History   Substance Use Topics     Smoking status: Never Smoker     Smokeless tobacco: Never Used     Alcohol use Yes      Comment: 6 pack per weekend twice monthly     Family History   Problem Relation Age of Onset     CANCER Father      Prostate      "Allergies Mother          Allergies, reviewed:     Allergies   Allergen Reactions     Amoxicillin      Rash     Erythromycin      Vomiting       Current Outpatient Prescriptions   Medication Sig Dispense Refill     MELATONIN PO        metoprolol (TOPROL-XL) 25 MG 24 hr tablet Take 1 tablet (25 mg) by mouth daily 30 tablet 6       Objective:  /86  Pulse 80  Temp 96.9  F (36.1  C) (Oral)  Resp 16  Ht 5' 10.5\" (1.791 m)  Wt 249 lb (112.9 kg)  SpO2 96%  BMI 35.22 kg/m2  General Appearance: Pleasant, alert, WN/WD in no acute respiratory distress.  Head Exam: Normal. Normocephalic, atraumatic. No sinus tenderness.  Eye Exam: Normal external eye, conjunctiva, lids. JUAN MANUEL.  Ear Exam: Normal auditory canals and external ears. Non-tender.  Left TM-normal. Right TM-normal.  OroPharynx Exam: Dental hygiene adequate. Normal buccal mucosa. Normal pharynx.  Neck Exam: Supple, no masses or enlarged, tender nodes.  Thyroid Exam: No nodules or enlargement or tenderness.  Chest/Respiratory Exam: Normal, comfortable, easy respirations. Chest wall normal. Lungs are clear to auscultation. No wheezing, crackles, or rhonchi.  Cardiovascular Exam: Regular rate and rhythm. No murmur, gallop, or rubs. No pedal edema.  Gastrointestinal Exam: Soft, non-tender, no masses or organomegaly.  Musculoskeletal Exam: Back is non-tender, full ROM of upper and lower extremities.  Skin: no rash, warm and dry.  Neurologic Exam: Nonfocal, no tremor. Normal gait.  Psychiatric Exam: Alert - appropriate, normal affect    ASSESSMENT/PLAN:    ICD-10-CM    1. Lightheadedness R42 CBC with platelets differential     TSH with free T4 reflex     Comprehensive metabolic panel     Lipid panel reflex to direct LDL Non-fasting   2. PVC's (premature ventricular contractions) I49.3 metoprolol (TOPROL-XL) 25 MG 24 hr tablet   3. Benign essential hypertension I10 metoprolol (TOPROL-XL) 25 MG 24 hr tablet     we discussed that intermittent and infrequent " palpitations with single heart skips due to stimulants, during episodes of stress or illness as described are common and are almost always PVCs a benign condition.  Additional history did not reveal any signs of chest pain, tachycardias or more serious heart related symptoms.    However he is having some lightheadedness and obvious hypertension.  We will do some labs today and I think he would benefit from initiation of beta blocker therapy    Recommend close follow-up        Counselled on medication choice, use, side effects of medications, nonprescription adjunct treatment and appropriate follow up. Couns time: 15 minutes of 25 minutes total face to face time.      Jac Brewster MD MPH

## 2017-11-29 LAB
ALBUMIN SERPL-MCNC: 4.8 G/DL (ref 3.4–5)
ALP SERPL-CCNC: 67 U/L (ref 40–150)
ALT SERPL W P-5'-P-CCNC: 72 U/L (ref 0–70)
ANION GAP SERPL CALCULATED.3IONS-SCNC: 14 MMOL/L (ref 3–14)
AST SERPL W P-5'-P-CCNC: 32 U/L (ref 0–45)
BILIRUB SERPL-MCNC: 0.6 MG/DL (ref 0.2–1.3)
BUN SERPL-MCNC: 21 MG/DL (ref 7–30)
CALCIUM SERPL-MCNC: 9.7 MG/DL (ref 8.5–10.1)
CHLORIDE SERPL-SCNC: 103 MMOL/L (ref 94–109)
CHOLEST SERPL-MCNC: 301 MG/DL
CO2 SERPL-SCNC: 20 MMOL/L (ref 20–32)
CREAT SERPL-MCNC: 1.29 MG/DL (ref 0.66–1.25)
GFR SERPL CREATININE-BSD FRML MDRD: 61 ML/MIN/1.7M2
GLUCOSE SERPL-MCNC: 118 MG/DL (ref 70–99)
HDLC SERPL-MCNC: 36 MG/DL
LDLC SERPL CALC-MCNC: 216 MG/DL
NONHDLC SERPL-MCNC: 265 MG/DL
POTASSIUM SERPL-SCNC: 4.5 MMOL/L (ref 3.4–5.3)
PROT SERPL-MCNC: 8.1 G/DL (ref 6.8–8.8)
SODIUM SERPL-SCNC: 137 MMOL/L (ref 133–144)
TRIGL SERPL-MCNC: 247 MG/DL
TSH SERPL DL<=0.005 MIU/L-ACNC: 1.18 MU/L (ref 0.4–4)

## 2018-01-16 ENCOUNTER — TELEPHONE (OUTPATIENT)
Dept: FAMILY MEDICINE | Facility: CLINIC | Age: 43
End: 2018-01-16

## 2018-01-16 DIAGNOSIS — F40.243 PHOBIA, FLYING: Primary | ICD-10-CM

## 2018-01-16 NOTE — TELEPHONE ENCOUNTER
JF,  Please see below message  Patient requesting Xanax for fear of flying  You have seen patient once - 11/28/2017 - fear of flying not discussed  Last Rx in chart from 5/4/2016 #20 from [facility]  Do you want phone visit to discuss?  Please advise.  Samreen SARAVIA RN

## 2018-01-16 NOTE — TELEPHONE ENCOUNTER
Pt requesting rx Alprazolam 0.5mg tabs.  This was rx'd for him 5/2016 for fear of flying.  He is taking a flight this Friday and needs this again.  Should he schedule an appt [facility]  Pt may be reached at [phone]. VM not set up.

## 2018-01-18 ENCOUNTER — OFFICE VISIT (OUTPATIENT)
Dept: FAMILY MEDICINE | Facility: CLINIC | Age: 43
End: 2018-01-18
Payer: COMMERCIAL

## 2018-01-18 VITALS
WEIGHT: 253 LBS | BODY MASS INDEX: 35.42 KG/M2 | RESPIRATION RATE: 20 BRPM | OXYGEN SATURATION: 97 % | HEIGHT: 71 IN | SYSTOLIC BLOOD PRESSURE: 172 MMHG | TEMPERATURE: 96.8 F | DIASTOLIC BLOOD PRESSURE: 102 MMHG | HEART RATE: 59 BPM

## 2018-01-18 DIAGNOSIS — F40.243 PHOBIA, FLYING: Primary | ICD-10-CM

## 2018-01-18 PROCEDURE — 99213 OFFICE O/P EST LOW 20 MIN: CPT | Performed by: FAMILY MEDICINE

## 2018-01-18 NOTE — MR AVS SNAPSHOT
"              After Visit Summary   1/18/2018    Wilfrid Vickers    MRN: [id number]           Patient Information     Date Of Birth          1975        Visit Information        Provider Department      1/18/2018 11:00 AM Luis Barraza MD [facility]        Today's Diagnoses     Phobia, flying    -  1       Follow-ups after your visit        Who to contact     If you have questions or need follow up information about today's clinic visit or your schedule please contact [facility] directly at [phone].  Normal or non-critical lab and imaging results will be communicated to you by Elliptichart, letter or phone within 4 business days after the clinic has received the results. If you do not hear from us within 7 days, please contact the clinic through Elliptichart or phone. If you have a critical or abnormal lab result, we will notify you by phone as soon as possible.  Submit refill requests through Enc[employer] or call your pharmacy and they will forward the refill request to us. Please allow 3 business days for your refill to be completed.          Additional Information About Your Visit        MyChart Information     [employer] gives you secure access to your electronic health record. If you see a primary care provider, you can also send messages to your care team and make appointments. If you have questions, please call your primary care clinic.  If you do not have a primary care provider, please call [phone] and they will assist you.        Care EveryWhere ID     This is your Care EveryWhere ID. This could be used by other organizations to access your [facility] medical records  [id number]        Your Vitals Were     Pulse Temperature Respirations Height Pulse Oximetry BMI (Body Mass Index)    59 96.8  F (36  C) (Oral) 20 5' 10.5\" (1.791 m) 97% 35.79 kg/m2       Blood Pressure from Last 3 Encounters:   01/18/18 (!) 172/102   11/28/17 146/86   07/28/17 (!) 134/93    Weight from Last 3 " Encounters:   01/18/18 253 lb (114.8 kg)   11/28/17 249 lb (112.9 kg)   07/28/17 250 lb (113.4 kg)              Today, you had the following     No orders found for display       Primary Care Provider Office Phone # Fax #    Jac Donta Brewster [phone]       [address] [facility] [address]        Equal Access to Services     PERCY ROMERO : Hadii aad ku hadasho Soomaali, waaxda luqadaha, qaybta kaalmada adeegyada, waxay idiin hayaan adeeg kharash lagrace . So [facility] [phone].    ATENCIÓN: Si habla español, tiene a slade disposición servicios gratuitos de asistencia lingüística. Llame al [phone].    We comply with applicable federal civil rights laws and [place] laws. We do not discriminate on the basis of race, color, national origin, age, disability, sex, sexual orientation, or gender identity.            Thank you!     Thank you for choosing [facility]  for your care. Our goal is always to provide you with excellent care. Hearing back from our patients is one way we can continue to improve our services. Please take a few minutes to complete the written survey that you may receive in the mail after your visit with us. Thank you!             Your Updated Medication List - Protect others around you: Learn how to safely use, store and throw away your medicines at www.disposemymeds.org.          This list is accurate as of: 1/18/18 12:23 PM.  Always use your most recent med list.                   Brand Name Dispense Instructions for use Diagnosis    MELATONIN PO           metoprolol succinate 25 MG 24 hr tablet    TOPROL-XL    30 tablet    Take 1 tablet (25 mg) by mouth daily    PVC's (premature ventricular contractions), Benign essential hypertension

## 2018-01-18 NOTE — NURSING NOTE
"Chief Complaint   Patient presents with     Medication Request     Xanax      BP (!) 172/102  Pulse 59  Temp 96.8  F (36  C) (Oral)  Resp 20  Ht 5' 10.5\" (1.791 m)  Wt 253 lb (114.8 kg)  SpO2 97%  BMI 35.79 kg/m2 Estimated body mass index is 35.79 kg/(m^2) as calculated from the following:    Height as of this encounter: 5' 10.5\" (1.791 m).    Weight as of this encounter: 253 lb (114.8 kg).  bp completed using cuff size: large      Health Maintenance addressed:  BP was high, used pink card, recheck manually    Possibly completing today per provider review.    Cris Toledo MA     "

## 2018-01-18 NOTE — TELEPHONE ENCOUNTER
I generally recommend against the use of alprazolam for flying phobia.  It has potential dangers and is known to actually increase people's anxiety about flying    He also needs a follow up visit for hypertension

## 2018-01-18 NOTE — PROGRESS NOTES
"  SUBJECTIVE:   Wilfrid Vickers is a 42 year old male who presents to clinic today for the following health issues:      Medication Followup of Xanax     Taking Medication as prescribed: yes    Side Effects:  None    Medication Helping Symptoms:  yes   The patient presents to clinic for medication refill. He reports that he has a flight phobia and was given a prescription of alprazolam from another provider at another medical facility two years ago which helped with his symptoms. Patient reports that his blood pressure is elevated because he is anxious about the flight tomorrow.     Additional history: as documented    Patient Active Problem List   Diagnosis     CARDIOVASCULAR SCREENING; LDL GOAL LESS THAN 160     Labral tear of shoulder     Recurrent shoulder dislocation     Phobia, flying     Elevated blood pressure reading without diagnosis of hypertension     Benign essential hypertension     PVC's (premature ventricular contractions)     Past Surgical History:   Procedure Laterality Date     ARTHROPLASTY SHOULDER Left     torn labrum, rotator cuff repair       Social History   Substance Use Topics     Smoking status: Never Smoker     Smokeless tobacco: Never Used     Alcohol use Yes      Comment: 6 pack per weekend twice monthly     Family History   Problem Relation Age of Onset     CANCER Father      Prostate     Allergies Mother              Reviewed and updated as needed this visit by clinical staffTobacco  Allergies  Med Hx  Surg Hx  Fam Hx  Soc Hx      Reviewed and updated as needed this visit by Provider         ROS:  Constitutional, HEENT, cardiovascular, pulmonary, gi and gu systems are negative, except as otherwise noted.      OBJECTIVE:   BP (!) 172/102  Pulse 59  Temp 96.8  F (36  C) (Oral)  Resp 20  Ht 5' 10.5\" (1.791 m)  Wt 253 lb (114.8 kg)  SpO2 97%  BMI 35.79 kg/m2  Body mass index is 35.79 kg/(m^2).  GENERAL: healthy, alert and no distress  PSYCH: angry and speaking with a loud " "voice.       ASSESSMENT/PLAN:   1. Phobia, flying  Assessment: The patient called clinic previously for medication refill and was informed by his primary care provider that  It is not recommended for flying phobia and is shown to increase pupils anxiety about flying.  At today's visit we had a discussion regarding the evidence behind benzodiazepines for flying phobia.  Patient was informed that we agree with recommendations by his primary care provider, and there is no robust evidence regarding benzodiazepines for flying.  The patient became agitated and verbally abusive, he verbalized \"NO, YOU DON'T UNDERSTAND. I do not care about the research, I am telling you it works for me.\"  He was informed that I understand his frustration but due to the lack of evidence I would recommend against using it.  He was informed that we can offer him other medications but he declined and insisted on alprazolam.  Patient stood up waving his hands in the air, and yelling \"why would you not just prescribe it to me.  The previous doctor did why did he do it.\" Patient was loud and verbally abusive which made the encounter unsafe to continue. The agitation and the verbal aggression made me afraid of a physical assault.  Clinic manager intervened and escorted the patient out of the clinic.    Luis Barraza MD  [facility]  PAGER: [phone]      "

## 2018-01-18 NOTE — TELEPHONE ENCOUNTER
"Patient very rude and upset he cannot get Xanax Rx  Continuously called me a liar - \"why are you lying to me?\"  \"He said it increases anxiety, but it helps mine, stop lying.\"  Told pt regardless he will not get Rx from [facility].  Was unable to give patient information about CBT because he was not listening to what I said and continued to argue with me  Conversation not going anywhere  Asked if there was anything else I could do  He said no and I told him to have a nice day  Conversation ended  Samreen SARAVIA RN    "

## 2018-01-18 NOTE — TELEPHONE ENCOUNTER
"JF  Spoke with patient.  Informed him of your message below.  Patient upset.  \"What am I suppose to do?.  Another provider prescribed this before why can he?\"  Again I read him you message.  Is there something you would recommend.  I asked if he's tried Dramamine before. States it makes him sick to his stomach.  Yumiko Valdez RN      "

## 2018-01-18 NOTE — TELEPHONE ENCOUNTER
The most effective treatment is cognitive-behavioral therapy (CBT) that includes exposure treatment.  It can cure flying phobia permanently.  Referral:  [phone]  I believe sedatives such as alprazolam should only be used in controlled settings, such as the hospital.    Sincerely,    Jac Brewster MD MPH

## 2018-01-19 ENCOUNTER — MYC MEDICAL ADVICE (OUTPATIENT)
Dept: FAMILY MEDICINE | Facility: CLINIC | Age: 43
End: 2018-01-19

## 2018-02-02 ENCOUNTER — OFFICE VISIT (OUTPATIENT)
Dept: FAMILY MEDICINE | Facility: CLINIC | Age: 43
End: 2018-02-02
Payer: COMMERCIAL

## 2018-02-02 DIAGNOSIS — I49.3 PVC'S (PREMATURE VENTRICULAR CONTRACTIONS): Primary | ICD-10-CM

## 2018-02-02 DIAGNOSIS — R42 LIGHT HEADEDNESS: ICD-10-CM

## 2018-02-02 DIAGNOSIS — F41.1 GAD (GENERALIZED ANXIETY DISORDER): ICD-10-CM

## 2018-02-02 DIAGNOSIS — G47.30 SLEEP APNEA, UNSPECIFIED TYPE: ICD-10-CM

## 2018-02-02 DIAGNOSIS — F40.243 FEAR OF FLYING: ICD-10-CM

## 2018-02-02 PROCEDURE — 99214 OFFICE O/P EST MOD 30 MIN: CPT | Performed by: FAMILY MEDICINE

## 2018-02-02 RX ORDER — ALPRAZOLAM 0.5 MG
0.5 TABLET ORAL 3 TIMES DAILY PRN
Qty: 20 TABLET | Refills: 0 | Status: SHIPPED | OUTPATIENT
Start: 2018-02-02 | End: 2019-04-25

## 2018-02-02 NOTE — MR AVS SNAPSHOT
After Visit Summary   2/2/2018    Wilfrid Vickers    MRN: [id number]           Patient Information     Date Of Birth          1975        Visit Information        Provider Department      2/2/2018 1:40 PM Guzman Walter MD [facility]        Today's Diagnoses     PVC's (premature ventricular contractions)    -  1    Light headedness        EILEEN (generalized anxiety disorder)        Sleep apnea, unspecified type        Fear of flying          Care Instructions      Understanding Premature Ventricular Contractions (PVCs)  Premature ventricular contractions (PVCs) are a type of abnormal heartbeat (arrhythmia). They are common and can occur in people of all ages from time to time. PVCs are almost never dangerous, but if you have other heart problems, PVCs can cause serious health issues.   How PVCs happen    Your heart has 4 chambers: 2 upper atria and 2 lower ventricles. Normally, a special group of cells begins the signal to start your heartbeat. These cells are in the sinoatrial (SA) node in the right atrium. The signal quickly travels down your heart s conducting system. It travels to the left and right ventricle. As it travels, the signal triggers nearby parts of your heart to contract. This allows your heart to squeeze in a coordinated way.  During a premature ventricular contraction, the signal to start your heartbeat instead comes from one of the ventricles. This signal is premature, meaning it happens before the SA node has had a chance to fire. The signal spreads through the rest of your heart, causing a heartbeat. If this happens very soon after the previous heartbeat, your heart will push out very little blood. This causes a feeling of a pause between beats. If it happens a little later, your heart pushes out an almost normal amount of blood. This leads to a feeling of an extra heartbeat. So, the heart has a  premature  heartbeat in between normal  heartbeats.  What causes PVCs?  PVCs can be considered a normal phenomenon, especially if they happen rarely, don't cause serious symptoms, and are not associated with other serious medical conditions. PVCs can be seen more often if you have certain conditions such as:     Advancing age    Reduced blood flow to your heart such as in coronary artery disease    Scarring after a heart attack (myocardial infarction)    Cardiomyopathy (heart muscle disease) of any cause    Heart failure    Heart valve disease    High blood pressure    Congenital heart disease    Electrolyte problems, such as low potassium levels    Increased adrenaline, such as with anxiety    Certain medicines, such as in digoxin toxicity  Rarely, frequent PVCs may be seen when there are no risk factors or other medical conditions. This is called idiopathic PVC.   What are the symptoms of PVCs?  Most people with infrequent PVCs don t have symptoms. However, the more PVCs you have, the more likely you are to feel them. When symptoms do happen, they are usually minor. Symptoms may include:    An awareness of the heart beating    A fluttering or flip-flop feeling in your chest    Feeling of a  skipped  or  extra  heartbeat    Dizziness and near-fainting    A pulsing sensation in the neck  PVCs may cause more severe symptoms if you have another heart problem, such as heart failure.  How are PVCs diagnosed?  Your healthcare provider will ask about your health history and give you a physical exam. Recording the PVCs and their frequency on a heart rhythm monitor is the best way to diagnose PVCs. This can best be done with:    Electrocardiogram (ECG). This test records the electrical activity of your heart. During an ECG, small pads (electrodes) are placed on your chest, arms, and legs. Wires connect the pads to a machine, which records your heart's electrical signals.    Heart monitoring. There are 2 types of external heart monitors:    Holter monitor. This  monitor can be worn for 1 to 7 days. It provides a constant recording of heart activity. After the test is done, your healthcare provider analyzes the recording.    Event monitor. These monitors can be used for 3 to 4 weeks. One kind is a memory loop recorder. This monitor records constantly, but stores the recording only when you press a button. The other kind is a credit card-sized recorder. This monitor is turned on only during an episode. With both types, you send the recordings of symptoms to your healthcare provider over the phone.  Other tests, which may be done to better evaluate your heart include:    Echocardiography. This test uses ultrasound to look at your heart s structure and function.    Cardiac stress testing. This test uses electrocardiogram during exercise to see how your heart responds and to evaluate heart artery blood flow.    Cardiac CT or MRI. These are imaging tests that shows details of the heart and blood vessels.    Blood tests. Blood tests may be done to check potassium and thyroid levels.  How are PVCs treated?  Treatment depends on the cause and if you have symptoms. If you have no symptoms and no underlying heart problems, you may need no treatment.   If it is needed, treatment can involve:     Lifestyle changes. Your doctor may suggest you exercise and limit caffeine and alcohol. If you smoke, you'll be advised to quit. Your doctor can help you find medicines or nicotine replacement products to help. A support group for those quitting smoking is also helpful.    Radiofrequency catheter ablation. This treatment uses radiofrequency energy to destroy the area of heart tissue that is causing the PVC.     Medicines. Two types of medicine can help with PVCs. Beta blockers and calcium channel blockers both can lower the heart rate and reduce blood pressure.   If you have structural heart problems, you may be referred to a doctor who specializes in the electrical system of the heart. You may  need treatment for an underlying heart problem. In severe cases, an ICD (implantable cardioverter defibrillator) may be implanted. This device can help normalize the heart rhythm.  Date Last Reviewed: 8/1/2017 2000-2017 [employer]. [address] [place] [address]. All rights reserved. This information is not intended as a substitute for professional medical care. Always follow your healthcare professional's instructions.        Treatment for Premature Ventricular Contractions (PVCs)  Premature ventricular contractions (PVCs) are a type of abnormal heartbeat (arrhythmia). They are very common. They can occur in people of all ages from time to time. They usually cause only mild symptoms.  Types of treatment   Most people with PVCs don t need any treatment. If you are treated for another problem with your heart, your PVCs may decrease. For example, you might take a medicine to lower your blood pressure. This may lower your rate of PVCs.  In some cases, specific treatment may be done to help prevent PVCs. These are used only if you have symptoms from PVCs. Choices include:    Medicines called beta-blockers    Other medicines to help prevent arrhythmias    Catheter ablation, a procedure to destroy the cells in the heart causing the abnormal beats  Living with PVCs  Your healthcare provider may give your more instructions about how to manage your PVCs, such as:    Eating a heart-healthy diet    Getting enough exercise    Maintaining a healthy weight    Not consuming too much alcohol or caffeine, which can trigger PVCs    Avoiding too much stress and fatigue, which can also trigger PVCs    Getting treatment for your other health conditions, such as high blood pressure    Making sure to keep all your medical appointments  When to call your healthcare provider  Call your healthcare provider right away if you have any of these:    Symptoms that get worse over time    Severe symptoms such as chest  pain, near-fainting, or sudden shortness of breath   Date Last Reviewed: 8/10/2015    1761-2167 [employer]. [address] [place] [address], [place] [address]. All rights reserved. This information is not intended as a substitute for professional medical care. Always follow your healthcare professional's instructions.      [facility]    If you have any questions regarding to your visit please contact your care team:       Team Purple:   Clinic Hours Telephone Number   Dr. Hailee Hammonds   7am-7pm  Monday - Thursday   7am-5pm  Fridays  [phone]  (Appointment scheduling available 24/7)    Questions about your Visit?   Team Line:  [phone]   Urgent Care - [place] and [facility] - 11am-9pm Monday-Friday Saturday-Sunday- 9am-5pm   [place] - 5pm-9pm Monday-Friday Saturday-Sunday- 9am-5pm  [phone] - [facility] [employer] [facility]  [phone] [facility]       What options do I have for visits at the clinic other than the traditional office visit?  To expand how we care for you, many of our providers are utilizing electronic visits (e-visits) and telephone visits, when medically appropriate, for interactions with their patients rather than a visit in the clinic.   We also offer nurse visits for many medical concerns. Just like any other service, we will bill your insurance company for this type of visit based on time spent on the phone with your provider. Not all insurance companies cover these visits. Please check with your medical insurance if this type of visit is covered. You will be responsible for any charges that are not paid by your insurance.      E-visits via [employer]:  generally incur a $35.00 fee.  Telephone visits:  Time spent on the phone: *charged based on time that is spent on the phone in increments of 10 minutes. Estimated cost:   5-10 mins $30.00   11-20 mins. $59.00   21-30 mins. $85.00     Use [employer] (secure  email communication and access to your chart) to send your primary care provider a message or make an appointment. Ask someone on your Team how to sign up for [employer].  For a Price Quote for your services, please call our Consumer Price Line at [phone].  As always, Thank you for trusting us with your health care needs!    Shantell Dallas MA            Follow-ups after your visit        Additional Services     CARDIOLOGY EVAL ADULT REFERRAL       Your provider has referred you to:  [facility]: [facility] [phone]   [email or web address]    Please be aware that coverage of these services is subject to the terms and limitations of your health insurance plan.  Call member services at your health plan with any benefit or coverage questions.      Type of Referral:  New Cardiology Consult    Timeframe requested:  1-3 Days    Please bring the following to your appointment:  >>   Any x-rays, CTs or MRIs which have been performed.  Contact the facility where they were done to arrange for  prior to your scheduled appointment.    >>   List of current medications  >>   This referral request   >>   Any documents/labs given to you for this referral            MENTAL HEALTH REFERRAL  - Adult; Outpatient Treatment; Individual/Couples/Family/Group Therapy/Health Psychology; [facility]: [facility] [phone]; We will contact you to schedule the appointment or please call with any questions       All scheduling is subject to the client's specific insurance plan & benefits, provider/location availability, and provider clinical specialities.  Please arrive 15 minutes early for your first appointment and bring your completed paperwork.    Please be aware that coverage of these services is subject to the terms and limitations of your health insurance plan.  Call member services at your health plan with any benefit or coverage  questions.                      SLEEP EVALUATION & MANAGEMENT REFERRAL - [facility]   [place] - [phone]       Please be aware that coverage of these services is subject to the terms and limitations of your health insurance plan.  Call member services at your health plan with any benefit or coverage questions.      Please bring the following to your appointment:    >>   List of current medications   >>   This referral request   >>   Any documents/labs given to you for this referral                      Follow-up notes from your care team     Return in about 2 months (around 4/2/2018) for BP Recheck with doc.      Future tests that were ordered for you today     Open Future Orders        Priority Expected Expires Ordered    SLEEP EVALUATION & MANAGEMENT REFERRAL - [facility] - [phone] Routine  2/2/2019 2/2/2018            Who to contact     If you have questions or need follow up information about today's clinic visit or your schedule please contact [facility] directly at [phone].  Normal or non-critical lab and imaging results will be communicated to you by [employer], letter or phone within 4 business days after the clinic has received the results. If you do not hear from us within 7 days, please contact the clinic through [employer] or phone. If you have a critical or abnormal lab result, we will notify you by phone as soon as possible.  Submit refill requests through [employer] or call your pharmacy and they will forward the refill request to us. Please allow 3 business days for your refill to be completed.          Additional Information About Your Visit        [employer] Information     [employer] gives you secure access to your electronic health record. If you see a primary care provider, you can also send messages to your care team and make appointments. If you have questions, please call your primary care clinic.  If  you do not have a primary care provider, please call [phone] and they will assist you.        Care EveryWhere ID     This is your Care EveryWhere ID. This could be used by other organizations to access your [place] medical records  [id number]         Blood Pressure from Last 3 Encounters:   01/18/18 (!) 172/102   11/28/17 146/86   07/28/17 (!) 134/93    Weight from Last 3 Encounters:   01/18/18 253 lb (114.8 kg)   11/28/17 249 lb (112.9 kg)   07/28/17 250 lb (113.4 kg)              We Performed the Following     CARDIOLOGY EVAL ADULT REFERRAL     MENTAL HEALTH REFERRAL  - Adult; Outpatient Treatment; Individual/Couples/Family/Group Therapy/Health Psychology; G: [facility] [phone]; We will contact you to schedule the appointment or please call with any questions          Today's Medication Changes          These changes are accurate as of 2/2/18  2:28 PM.  If you have any questions, ask your nurse or doctor.               Start taking these medicines.        Dose/Directions    ALPRAZolam 0.5 MG tablet   Commonly known as:  XANAX   Used for:  EILENE (generalized anxiety disorder), Fear of flying        Dose:  0.5 mg   Take 1 tablet (0.5 mg) by mouth 3 times daily as needed for anxiety (As needed 15mins prior to take-off)   Quantity:  20 tablet   Refills:  0            Where to get your medicines      Some of these will need a paper prescription and others can be bought over the counter.  Ask your nurse if you have questions.     Bring a paper prescription for each of these medications     ALPRAZolam 0.5 MG tablet                Primary Care Provider Office Phone # Fax #    Jac Donta Brewster [phone] [address] [facility] [address]        Equal Access to Services     PERCY ROMERO : Beto Black, edwina mahoney, ro ray. So [facility] [phone].    ATENCIÓN: Rich vizcaino  español, tiene a slade disposición servicios gratuitos de asistencia lingüística. Grace parisi [phone].    We comply with applicable federal civil rights laws and [place] laws. We do not discriminate on the basis of race, color, national origin, age, disability, sex, sexual orientation, or gender identity.            Thank you!     Thank you for choosing [facility]  for your care. Our goal is always to provide you with excellent care. Hearing back from our patients is one way we can continue to improve our services. Please take a few minutes to complete the written survey that you may receive in the mail after your visit with us. Thank you!             Your Updated Medication List - Protect others around you: Learn how to safely use, store and throw away your medicines at www.disposemymeds.org.          This list is accurate as of 2/2/18  2:28 PM.  Always use your most recent med list.                   Brand Name Dispense Instructions for use Diagnosis    ALPRAZolam 0.5 MG tablet    XANAX    20 tablet    Take 1 tablet (0.5 mg) by mouth 3 times daily as needed for anxiety (As needed 15mins prior to take-off)    EILEEN (generalized anxiety disorder), Fear of flying       MELATONIN PO           metoprolol succinate 25 MG 24 hr tablet    TOPROL-XL    30 tablet    Take 1 tablet (25 mg) by mouth daily    PVC's (premature ventricular contractions), Benign essential hypertension

## 2018-02-02 NOTE — PATIENT INSTRUCTIONS
Understanding Premature Ventricular Contractions (PVCs)  Premature ventricular contractions (PVCs) are a type of abnormal heartbeat (arrhythmia). They are common and can occur in people of all ages from time to time. PVCs are almost never dangerous, but if you have other heart problems, PVCs can cause serious health issues.   How PVCs happen    Your heart has 4 chambers: 2 upper atria and 2 lower ventricles. Normally, a special group of cells begins the signal to start your heartbeat. These cells are in the sinoatrial (SA) node in the right atrium. The signal quickly travels down your heart s conducting system. It travels to the left and right ventricle. As it travels, the signal triggers nearby parts of your heart to contract. This allows your heart to squeeze in a coordinated way.  During a premature ventricular contraction, the signal to start your heartbeat instead comes from one of the ventricles. This signal is premature, meaning it happens before the SA node has had a chance to fire. The signal spreads through the rest of your heart, causing a heartbeat. If this happens very soon after the previous heartbeat, your heart will push out very little blood. This causes a feeling of a pause between beats. If it happens a little later, your heart pushes out an almost normal amount of blood. This leads to a feeling of an extra heartbeat. So, the heart has a  premature  heartbeat in between normal heartbeats.  What causes PVCs?  PVCs can be considered a normal phenomenon, especially if they happen rarely, don't cause serious symptoms, and are not associated with other serious medical conditions. PVCs can be seen more often if you have certain conditions such as:     Advancing age    Reduced blood flow to your heart such as in coronary artery disease    Scarring after a heart attack (myocardial infarction)    Cardiomyopathy (heart muscle disease) of any cause    Heart failure    Heart valve disease    High blood  pressure    Congenital heart disease    Electrolyte problems, such as low potassium levels    Increased adrenaline, such as with anxiety    Certain medicines, such as in digoxin toxicity  Rarely, frequent PVCs may be seen when there are no risk factors or other medical conditions. This is called idiopathic PVC.   What are the symptoms of PVCs?  Most people with infrequent PVCs don t have symptoms. However, the more PVCs you have, the more likely you are to feel them. When symptoms do happen, they are usually minor. Symptoms may include:    An awareness of the heart beating    A fluttering or flip-flop feeling in your chest    Feeling of a  skipped  or  extra  heartbeat    Dizziness and near-fainting    A pulsing sensation in the neck  PVCs may cause more severe symptoms if you have another heart problem, such as heart failure.  How are PVCs diagnosed?  Your healthcare provider will ask about your health history and give you a physical exam. Recording the PVCs and their frequency on a heart rhythm monitor is the best way to diagnose PVCs. This can best be done with:    Electrocardiogram (ECG). This test records the electrical activity of your heart. During an ECG, small pads (electrodes) are placed on your chest, arms, and legs. Wires connect the pads to a machine, which records your heart's electrical signals.    Heart monitoring. There are 2 types of external heart monitors:    Holter monitor. This monitor can be worn for 1 to 7 days. It provides a constant recording of heart activity. After the test is done, your healthcare provider analyzes the recording.    Event monitor. These monitors can be used for 3 to 4 weeks. One kind is a memory loop recorder. This monitor records constantly, but stores the recording only when you press a button. The other kind is a credit card-sized recorder. This monitor is turned on only during an episode. With both types, you send the recordings of symptoms to your healthcare  provider over the phone.  Other tests, which may be done to better evaluate your heart include:    Echocardiography. This test uses ultrasound to look at your heart s structure and function.    Cardiac stress testing. This test uses electrocardiogram during exercise to see how your heart responds and to evaluate heart artery blood flow.    Cardiac CT or MRI. These are imaging tests that shows details of the heart and blood vessels.    Blood tests. Blood tests may be done to check potassium and thyroid levels.  How are PVCs treated?  Treatment depends on the cause and if you have symptoms. If you have no symptoms and no underlying heart problems, you may need no treatment.   If it is needed, treatment can involve:     Lifestyle changes. Your doctor may suggest you exercise and limit caffeine and alcohol. If you smoke, you'll be advised to quit. Your doctor can help you find medicines or nicotine replacement products to help. A support group for those quitting smoking is also helpful.    Radiofrequency catheter ablation. This treatment uses radiofrequency energy to destroy the area of heart tissue that is causing the PVC.     Medicines. Two types of medicine can help with PVCs. Beta blockers and calcium channel blockers both can lower the heart rate and reduce blood pressure.   If you have structural heart problems, you may be referred to a doctor who specializes in the electrical system of the heart. You may need treatment for an underlying heart problem. In severe cases, an ICD (implantable cardioverter defibrillator) may be implanted. This device can help normalize the heart rhythm.  Date Last Reviewed: 8/1/2017 2000-2017 [employer]. [address] [place] [address], [place] [address]. All rights reserved. This information is not intended as a substitute for professional medical care. Always follow your healthcare professional's instructions.        Treatment for Premature Ventricular Contractions  (PVCs)  Premature ventricular contractions (PVCs) are a type of abnormal heartbeat (arrhythmia). They are very common. They can occur in people of all ages from time to time. They usually cause only mild symptoms.  Types of treatment   Most people with PVCs don t need any treatment. If you are treated for another problem with your heart, your PVCs may decrease. For example, you might take a medicine to lower your blood pressure. This may lower your rate of PVCs.  In some cases, specific treatment may be done to help prevent PVCs. These are used only if you have symptoms from PVCs. Choices include:    Medicines called beta-blockers    Other medicines to help prevent arrhythmias    Catheter ablation, a procedure to destroy the cells in the heart causing the abnormal beats  Living with PVCs  Your healthcare provider may give your more instructions about how to manage your PVCs, such as:    Eating a heart-healthy diet    Getting enough exercise    Maintaining a healthy weight    Not consuming too much alcohol or caffeine, which can trigger PVCs    Avoiding too much stress and fatigue, which can also trigger PVCs    Getting treatment for your other health conditions, such as high blood pressure    Making sure to keep all your medical appointments  When to call your healthcare provider  Call your healthcare provider right away if you have any of these:    Symptoms that get worse over time    Severe symptoms such as chest pain, near-fainting, or sudden shortness of breath   Date Last Reviewed: 8/10/2015    0226-7989 [employer]. [address] [place] [address]. All rights reserved. This information is not intended as a substitute for professional medical care. Always follow your healthcare professional's instructions.      [facility]    If you have any questions regarding to your visit please contact your care team:       Team Purple:   Clinic Hours Telephone Number   Dr. Stephen  Janis Hammonds   7am-7pm  Monday - Thursday   7am-5pm  Fridays  [phone]  (Appointment scheduling available 24/7)    Questions about your Visit?   Team Line:  [phone]   Urgent Care - [place] and [facility] - 11am-9pm Monday-Friday Saturday-Sunday- 9am-5pm   [place] - 5pm-9pm Monday-Friday Saturday-Sunday- 9am-5pm  [phone] - [place]   [phone] - [place]       What options do I have for visits at the clinic other than the traditional office visit?  To expand how we care for you, many of our providers are utilizing electronic visits (e-visits) and telephone visits, when medically appropriate, for interactions with their patients rather than a visit in the clinic.   We also offer nurse visits for many medical concerns. Just like any other service, we will bill your insurance company for this type of visit based on time spent on the phone with your provider. Not all insurance companies cover these visits. Please check with your medical insurance if this type of visit is covered. You will be responsible for any charges that are not paid by your insurance.      E-visits via [employer]:  generally incur a $35.00 fee.  Telephone visits:  Time spent on the phone: *charged based on time that is spent on the phone in increments of 10 minutes. Estimated cost:   5-10 mins $30.00   11-20 mins. $59.00   21-30 mins. $85.00     Use [employer] (secure email communication and access to your chart) to send your primary care provider a message or make an appointment. Ask someone on your Team how to sign up for [employer].  For a Price Quote for your services, please call our Consumer Price Line at [phone].  As always, Thank you for trusting us with your health care needs!    Shantell Dallas MA

## 2018-02-02 NOTE — PROGRESS NOTES
SUBJECTIVE:   Wilfrid Vickers is a 42 year old male who presents to clinic today for the following health issues:    Chief Complaint   Patient presents with     Syncope     dizziness x 4-5 months Patient refuses all vitals. Patient would like referral to cardiology and that is the only reason he is here.     Patient refused vitals today  Patient has h/o PVC's off and on  Has high blood pressure  Lately in last 4 months has had dizzy spells, was given a beta blocker in the past, for blood pressure and PVCs, however has not been taking it  Has had PVC more frequently lately  Has anxiety associated with it  Patient is requesting a cardiology referral as well as a referral to psych for treatment of flight phobia.    Problem list and histories reviewed & adjusted, as indicated.  Additional history: as documented    BP Readings from Last 3 Encounters:   02/03/18 (!) 135/98   01/18/18 (!) 172/102   11/28/17 146/86    Wt Readings from Last 3 Encounters:   02/03/18 254 lb 13.6 oz (115.6 kg)   01/18/18 253 lb (114.8 kg)   11/28/17 249 lb (112.9 kg)           Reviewed and updated as needed this visit by clinical staff  Allergies  Meds  Problems       Reviewed and updated as needed this visit by Provider  Allergies  Meds  Problems         ROS:  Constitutional, HEENT, cardiovascular, pulmonary, gi and gu systems are negative, except as otherwise noted.    OBJECTIVE:     There were no vitals taken for this visit.  There is no height or weight on file to calculate BMI.  GENERAL: healthy, alert and no distress  EYES: Eyes grossly normal to inspection, PERRL and conjunctivae and sclerae normal  HENT: ear canals and TM's normal, nose and mouth without ulcers or lesions  NECK: no adenopathy, no asymmetry, masses, or scars and thyroid normal to palpation  RESP: lungs clear to auscultation - no rales, rhonchi or wheezes  CV: regular rate and rhythm, normal S1 S2, no S3 or S4, no murmur, click or rub, no peripheral edema and  peripheral pulses strong  MS: no gross musculoskeletal defects noted, no edema  SKIN: no suspicious lesions or rashes  NEURO: Normal strength and tone, mentation intact and speech normal  PSYCH: mentation appears normal, affect normal/bright    ASSESSMENT/PLAN:     1. PVC's (premature ventricular contractions)    - CARDIOLOGY EVAL ADULT REFERRAL    2. Light headedness  Discussed how this is possibly due to poorly controlled HTN  - CARDIOLOGY EVAL ADULT REFERRAL    3. EILEEN (generalized anxiety disorder)    - MENTAL HEALTH REFERRAL  - Adult; Outpatient Treatment; Individual/Couples/Family/Group Therapy/Health Psychology; [facility]: [facility] [phone]; We will contact you to schedule the appointment or please call with any questions  - ALPRAZolam (XANAX) 0.5 MG tablet; Take 1 tablet (0.5 mg) by mouth 3 times daily as needed for anxiety (As needed 15mins prior to take-off)  Dispense: 20 tablet; Refill: 0    4. Sleep apnea, unspecified type    - SLEEP EVALUATION & MANAGEMENT REFERRAL - ADULT -[facility] - [facility] - [phone]; Future    5. Fear of flying    - ALPRAZolam (XANAX) 0.5 MG tablet; Take 1 tablet (0.5 mg) by mouth 3 times daily as needed for anxiety (As needed 15mins prior to take-off)  Dispense: 20 tablet; Refill: 0    Follow-up in 2 months for HTN    Guzman Hammonds MD  [facility]

## 2018-02-03 ENCOUNTER — NURSE TRIAGE (OUTPATIENT)
Dept: NURSING | Facility: CLINIC | Age: 43
End: 2018-02-03

## 2018-02-03 ENCOUNTER — APPOINTMENT (OUTPATIENT)
Dept: CT IMAGING | Facility: CLINIC | Age: 43
End: 2018-02-03
Attending: EMERGENCY MEDICINE
Payer: COMMERCIAL

## 2018-02-03 ENCOUNTER — HOSPITAL ENCOUNTER (EMERGENCY)
Facility: CLINIC | Age: 43
Discharge: HOME | End: 2018-02-03
Attending: EMERGENCY MEDICINE | Admitting: EMERGENCY MEDICINE
Payer: COMMERCIAL

## 2018-02-03 VITALS
RESPIRATION RATE: 16 BRPM | TEMPERATURE: 97.6 F | DIASTOLIC BLOOD PRESSURE: 98 MMHG | BODY MASS INDEX: 36.05 KG/M2 | WEIGHT: 254.85 LBS | HEART RATE: 74 BPM | SYSTOLIC BLOOD PRESSURE: 135 MMHG | OXYGEN SATURATION: 95 %

## 2018-02-03 DIAGNOSIS — Z91.148 NONCOMPLIANCE WITH MEDICATION REGIMEN: ICD-10-CM

## 2018-02-03 DIAGNOSIS — I10 BENIGN ESSENTIAL HYPERTENSION: ICD-10-CM

## 2018-02-03 DIAGNOSIS — I10 ESSENTIAL HYPERTENSION: ICD-10-CM

## 2018-02-03 DIAGNOSIS — I49.3 PVC'S (PREMATURE VENTRICULAR CONTRACTIONS): ICD-10-CM

## 2018-02-03 LAB
ALBUMIN SERPL-MCNC: 4.5 G/DL (ref 3.4–5)
ALP SERPL-CCNC: 62 U/L (ref 40–150)
ALT SERPL W P-5'-P-CCNC: 58 U/L (ref 0–70)
ANION GAP SERPL CALCULATED.3IONS-SCNC: 9 MMOL/L (ref 3–14)
AST SERPL W P-5'-P-CCNC: 17 U/L (ref 0–45)
BASOPHILS # BLD AUTO: 0.1 10E9/L (ref 0–0.2)
BASOPHILS NFR BLD AUTO: 0.5 %
BILIRUB SERPL-MCNC: 0.4 MG/DL (ref 0.2–1.3)
BUN SERPL-MCNC: 14 MG/DL (ref 7–30)
CALCIUM SERPL-MCNC: 8.7 MG/DL (ref 8.5–10.1)
CHLORIDE SERPL-SCNC: 104 MMOL/L (ref 94–109)
CO2 SERPL-SCNC: 28 MMOL/L (ref 20–32)
CREAT SERPL-MCNC: 1.04 MG/DL (ref 0.66–1.25)
DIFFERENTIAL METHOD BLD: NORMAL
EOSINOPHIL # BLD AUTO: 0.4 10E9/L (ref 0–0.7)
EOSINOPHIL NFR BLD AUTO: 4.5 %
ERYTHROCYTE [DISTWIDTH] IN BLOOD BY AUTOMATED COUNT: 12.3 % (ref 10–15)
GFR SERPL CREATININE-BSD FRML MDRD: 78 ML/MIN/1.7M2
GLUCOSE SERPL-MCNC: 173 MG/DL (ref 70–99)
HBA1C MFR BLD: 6.9 % (ref 4.3–6)
HCT VFR BLD AUTO: 47.7 % (ref 40–53)
HGB BLD-MCNC: 16 G/DL (ref 13.3–17.7)
IMM GRANULOCYTES # BLD: 0 10E9/L (ref 0–0.4)
IMM GRANULOCYTES NFR BLD: 0.2 %
LYMPHOCYTES # BLD AUTO: 3.2 10E9/L (ref 0.8–5.3)
LYMPHOCYTES NFR BLD AUTO: 34 %
MCH RBC QN AUTO: 31.8 PG (ref 26.5–33)
MCHC RBC AUTO-ENTMCNC: 33.5 G/DL (ref 31.5–36.5)
MCV RBC AUTO: 95 FL (ref 78–100)
MONOCYTES # BLD AUTO: 0.8 10E9/L (ref 0–1.3)
MONOCYTES NFR BLD AUTO: 8.2 %
NEUTROPHILS # BLD AUTO: 5 10E9/L (ref 1.6–8.3)
NEUTROPHILS NFR BLD AUTO: 52.6 %
NRBC # BLD AUTO: 0 10*3/UL
NRBC BLD AUTO-RTO: 0 /100
PLATELET # BLD AUTO: 246 10E9/L (ref 150–450)
POTASSIUM SERPL-SCNC: 4 MMOL/L (ref 3.4–5.3)
PROT SERPL-MCNC: 7.9 G/DL (ref 6.8–8.8)
RBC # BLD AUTO: 5.03 10E12/L (ref 4.4–5.9)
SODIUM SERPL-SCNC: 141 MMOL/L (ref 133–144)
TROPONIN I SERPL-MCNC: <0.015 UG/L (ref 0–0.04)
WBC # BLD AUTO: 9.4 10E9/L (ref 4–11)

## 2018-02-03 PROCEDURE — 84484 ASSAY OF TROPONIN QUANT: CPT | Performed by: EMERGENCY MEDICINE

## 2018-02-03 PROCEDURE — 85025 COMPLETE CBC W/AUTO DIFF WBC: CPT | Performed by: EMERGENCY MEDICINE

## 2018-02-03 PROCEDURE — 93010 ELECTROCARDIOGRAM REPORT: CPT | Mod: Z6 | Performed by: EMERGENCY MEDICINE

## 2018-02-03 PROCEDURE — 96375 TX/PRO/DX INJ NEW DRUG ADDON: CPT | Performed by: EMERGENCY MEDICINE

## 2018-02-03 PROCEDURE — 80053 COMPREHEN METABOLIC PANEL: CPT | Performed by: EMERGENCY MEDICINE

## 2018-02-03 PROCEDURE — 99285 EMERGENCY DEPT VISIT HI MDM: CPT | Mod: 25 | Performed by: EMERGENCY MEDICINE

## 2018-02-03 PROCEDURE — 25000128 H RX IP 250 OP 636: Performed by: EMERGENCY MEDICINE

## 2018-02-03 PROCEDURE — 93005 ELECTROCARDIOGRAM TRACING: CPT | Performed by: EMERGENCY MEDICINE

## 2018-02-03 PROCEDURE — 70450 CT HEAD/BRAIN W/O DYE: CPT

## 2018-02-03 PROCEDURE — 96374 THER/PROPH/DIAG INJ IV PUSH: CPT | Performed by: EMERGENCY MEDICINE

## 2018-02-03 PROCEDURE — 83036 HEMOGLOBIN GLYCOSYLATED A1C: CPT | Performed by: EMERGENCY MEDICINE

## 2018-02-03 RX ORDER — LABETALOL HYDROCHLORIDE 5 MG/ML
10 INJECTION, SOLUTION INTRAVENOUS EVERY 30 MIN PRN
Status: DISCONTINUED | OUTPATIENT
Start: 2018-02-03 | End: 2018-02-03 | Stop reason: HOSPADM

## 2018-02-03 RX ORDER — KETOROLAC TROMETHAMINE 30 MG/ML
30 INJECTION, SOLUTION INTRAMUSCULAR; INTRAVENOUS ONCE
Status: COMPLETED | OUTPATIENT
Start: 2018-02-03 | End: 2018-02-03

## 2018-02-03 RX ORDER — METOPROLOL SUCCINATE 25 MG/1
25 TABLET, EXTENDED RELEASE ORAL DAILY
Qty: 30 TABLET | Refills: 0 | Status: SHIPPED | OUTPATIENT
Start: 2018-02-03 | End: 2018-02-07

## 2018-02-03 RX ADMIN — Medication 10 MG: at 17:21

## 2018-02-03 RX ADMIN — KETOROLAC TROMETHAMINE 30 MG: 30 INJECTION, SOLUTION INTRAMUSCULAR at 17:53

## 2018-02-03 ASSESSMENT — ENCOUNTER SYMPTOMS
VOMITING: 0
HEADACHES: 1
SHORTNESS OF BREATH: 0
NAUSEA: 0

## 2018-02-03 NOTE — LETTER
February 3, 2018      To Whom It May Concern:      Wilfrid Vickers was seen in our Emergency Department today, 02/03/18.  I expect his condition to improve over the next few days.  He may return to work/school on 2/5/18.    Sincerely,        Te Aguilera MD, MD

## 2018-02-03 NOTE — ED AVS SNAPSHOT
[facility], [place], Emergency Department    [address] [place] [id number]    Phone:  [phone]                                       Wilfrid Vickers   MRN: [id number]    Department:  [facility], Emergency Department   Date of Visit:  2/3/2018           After Visit Summary Signature Page     I have received my discharge instructions, and my questions have been answered. I have discussed any challenges I see with this plan with the nurse or doctor.    ..........................................................................................................................................  Patient/Patient Representative Signature      ..........................................................................................................................................  Patient Representative Print Name and Relationship to Patient    ..................................................               ................................................  Date                                            Time    ..........................................................................................................................................  Reviewed by Signature/Title    ...................................................              ..............................................  Date                                                            Time

## 2018-02-03 NOTE — ED PROVIDER NOTES
History     Chief Complaint   Patient presents with     Hypertension     HPI  Wilfrid Vickers is a 42 year old male who was recently diagnosed in the last 6 months with hypertension.  Patient was placed on some metoprolol, but states that on follow-up appointment with his physician he lost elyse in his physician and got into a verbal argument.  At that time his blood pressure was 175/110 and he thought it was due to his stress and anger.  Patient states that he stopped taking his metoprolol approximately 1 month ago thinking that he did not need it.  Patient states that over the last 1 month he has been having sharp headaches in his right temporal area that have worsened with his blood pressure.  Patient denies any nausea or vomiting, denies focal numbness or weakness, denies blurriness of vision.  Patient states that he went and got a blood pressure monitor to take his blood pressure over the last few days and he has found it to be 200/125.  Patient finally called a nurse care line that told him to go to a local fire department and have his blood pressure checked.  He did that this morning and found his blood pressure to be 190/125.  Patient was told to come to the ER for further evaluation.  Patient complains of some intermittent sharp chest pains, but no chest pressure, no nausea or vomiting, and no shortness of breath.      This part of the document was transcribed by Sangita Christine Medical Scribe.   I have reviewed the Medications, Allergies, Past Medical and Surgical History, and Social History in the [employer] system.  Past Medical History:   Diagnosis Date     Hypertension        Past Surgical History:   Procedure Laterality Date     ARTHROPLASTY SHOULDER Left     torn labrum, rotator cuff repair       Family History   Problem Relation Age of Onset     CANCER Father      Prostate     Allergies Mother        Social History   Substance Use Topics     Smoking status: Never Smoker     Smokeless tobacco: Never  Used     Alcohol use Yes      Comment: 6 pack per weekend twice monthly     Previous Medications    ALPRAZOLAM (XANAX) 0.5 MG TABLET    Take 1 tablet (0.5 mg) by mouth 3 times daily as needed for anxiety (As needed 15mins prior to take-off)    MELATONIN PO            Allergies   Allergen Reactions     Amoxicillin      Rash     Erythromycin      Vomiting       Review of Systems   Respiratory: Negative for shortness of breath.    Cardiovascular: Positive for chest pain.   Gastrointestinal: Negative for nausea and vomiting.   Neurological: Positive for headaches.   All other systems reviewed and are negative.      Physical Exam   BP: (!) 174/118  Pulse: 72  Heart Rate: 67  Temp: 97.6  F (36.4  C)  Resp: 16  Weight: 115.6 kg (254 lb 13.6 oz)  SpO2: 97 %      Physical Exam   Constitutional: He is oriented to person, place, and time.   Anxious but alert and conversant   HENT:   Head: Atraumatic.   Eyes: EOM are normal. Pupils are equal, round, and reactive to light.   Neck: Neck supple. No JVD present.   Cardiovascular: Normal heart sounds.    Pulmonary/Chest: He has no wheezes. He has no rales.   Abdominal: Soft. There is no tenderness.   Musculoskeletal: He exhibits no edema, tenderness or deformity.   Neurological: He is alert and oriented to person, place, and time. No cranial nerve deficit.   Grossly intact and symmetric   Skin: No rash noted.   Psychiatric: He has a normal mood and affect.       ED Course     ED Course     Procedures          Patient was placed on cardiac monitor and oximetry.  IV was established for blood draw.    EKG revealed a normal sinus rhythm at a rate of 72 with a SC interval 0.178 and a QRS duration of 0.098.  The patient had a borderline leftward axis with no acute ST or T-wave changes significant for ischemia.  There was no increased voltage consistent with LVH as would be seen with long-standing hypertension.  This is read by me personally.    Results for orders placed or performed  during the hospital encounter of 02/03/18   Head CT w/o contrast    Narrative         Impression    Impression: No acute intracranial pathology..     CBC with platelets differential   Result Value Ref Range    WBC 9.4 4.0 - 11.0 10e9/L    RBC Count 5.03 4.4 - 5.9 10e12/L    Hemoglobin 16.0 13.3 - 17.7 g/dL    Hematocrit 47.7 40.0 - 53.0 %    MCV 95 78 - 100 fl    MCH 31.8 26.5 - 33.0 pg    MCHC 33.5 31.5 - 36.5 g/dL    RDW 12.3 10.0 - 15.0 %    Platelet Count 246 150 - 450 10e9/L    Diff Method Automated Method     % Neutrophils 52.6 %    % Lymphocytes 34.0 %    % Monocytes 8.2 %    % Eosinophils 4.5 %    % Basophils 0.5 %    % Immature Granulocytes 0.2 %    Nucleated RBCs 0 0 /100    Absolute Neutrophil 5.0 1.6 - 8.3 10e9/L    Absolute Lymphocytes 3.2 0.8 - 5.3 10e9/L    Absolute Monocytes 0.8 0.0 - 1.3 10e9/L    Absolute Eosinophils 0.4 0.0 - 0.7 10e9/L    Absolute Basophils 0.1 0.0 - 0.2 10e9/L    Abs Immature Granulocytes 0.0 0 - 0.4 10e9/L    Absolute Nucleated RBC 0.0    Troponin I   Result Value Ref Range    Troponin I ES <0.015 0.000 - 0.045 ug/L   Comprehensive metabolic panel   Result Value Ref Range    Sodium 141 133 - 144 mmol/L    Potassium 4.0 3.4 - 5.3 mmol/L    Chloride 104 94 - 109 mmol/L    Carbon Dioxide 28 20 - 32 mmol/L    Anion Gap 9 3 - 14 mmol/L    Glucose 173 (H) 70 - 99 mg/dL    Urea Nitrogen 14 7 - 30 mg/dL    Creatinine 1.04 0.66 - 1.25 mg/dL    GFR Estimate 78 >60 mL/min/1.7m2    GFR Estimate If Black >90 >60 mL/min/1.7m2    Calcium 8.7 8.5 - 10.1 mg/dL    Bilirubin Total 0.4 0.2 - 1.3 mg/dL    Albumin 4.5 3.4 - 5.0 g/dL    Protein Total 7.9 6.8 - 8.8 g/dL    Alkaline Phosphatase 62 40 - 150 U/L    ALT 58 0 - 70 U/L    AST 17 0 - 45 U/L       Labs Ordered and Resulted from Time of ED Arrival Up to the Time of Departure from the ED   COMPREHENSIVE METABOLIC PANEL - Abnormal; Notable for the following:        Result Value    Glucose 173 (*)     All other components within normal limits    CBC WITH PLATELETS DIFFERENTIAL   TROPONIN I   HEMOGLOBIN A1C   PULSE OXIMETRY NURSING   CARDIAC CONTINUOUS MONITORING   PERIPHERAL IV CATHETER   PATIENT CARE ORDER       Assessments & Plan (with Medical Decision Making)     I have reviewed the nursing notes.    Medications   labetalol (NORMODYNE/TRANDATE) injection 10 mg (10 mg Intravenous Given 2/3/18 1721)   ketorolac (TORADOL) injection 30 mg (30 mg Intravenous Given 2/3/18 1753)     After 1 dose of labetalol and Toradol for his headache, patient's blood pressure came down to 135/98 with a pulse of 71.  At this time the patient is comfortable, his head CT is negative, and he will be sent home.    I have reviewed the findings, diagnosis, plan and need for follow up with the patient.    Patient's Medications   New Prescriptions    No medications on file   Previous Medications    ALPRAZOLAM (XANAX) 0.5 MG TABLET    Take 1 tablet (0.5 mg) by mouth 3 times daily as needed for anxiety (As needed 15mins prior to take-off)    MELATONIN PO       Modified Medications    Modified Medication Previous Medication    METOPROLOL SUCCINATE (TOPROL-XL) 25 MG 24 HR TABLET metoprolol (TOPROL-XL) 25 MG 24 hr tablet       Take 1 tablet (25 mg) by mouth daily    Take 1 tablet (25 mg) by mouth daily   Discontinued Medications    No medications on file       Final diagnoses:   Essential hypertension   Noncompliance with medication regimen     Restart your metoprolol.  No work until 2/5/2018.  Work note given    Please make an appointment to follow up with Your Primary Care Provider or our Primary Care Center (phone: [phone]) in one week for recheck.    Routine discharge instructions were given for this diagnosis    Te Aguilera MD    2/3/2018   [facility], EMERGENCY DEPARTMENT     Te Aguilera MD  02/03/18 [id number]

## 2018-02-03 NOTE — ED AVS SNAPSHOT
[facility], Emergency Department    [address] [place] [address]    Phone:  [phone]                                       Wilfrid Vickers   MRN: [id number]    Department:  [facility], Emergency Department   Date of Visit:  2/3/2018           Patient Information     Date Of Birth          1975        Your diagnoses for this visit were:     Essential hypertension     Noncompliance with medication regimen     PVC's (premature ventricular contractions)     Benign essential hypertension        You were seen by Te Aguilera MD.        Discharge Instructions       Restart your metoprolol.  No work until 2/5/2018.    Please make an appointment to follow up with Your Primary Care Provider or our Primary Care Center (phone: [phone]) in one week for recheck.    Discharge References/Attachments     HIGH BLOOD PRESSURE, CONTROLLING (ENGLISH)      24 Hour Appointment Hotline       To make an appointment at any [facility] clinic, call [phone] ([phone]). If you don't have a family doctor or clinic, we will help you find one. [facility] clinics are conveniently located to serve the needs of you and your family.             Review of your medicines      Our records show that you are taking the medicines listed below. If these are incorrect, please call your family doctor or clinic.        Dose / Directions Last dose taken    ALPRAZolam 0.5 MG tablet   Commonly known as:  XANAX   Dose:  0.5 mg   Quantity:  20 tablet        Take 1 tablet (0.5 mg) by mouth 3 times daily as needed for anxiety (As needed 15mins prior to take-off)   Refills:  0        MELATONIN PO        Refills:  0        metoprolol succinate 25 MG 24 hr tablet   Commonly known as:  TOPROL-XL   Dose:  25 mg   Quantity:  30 tablet        Take 1 tablet (25 mg) by mouth daily   Refills:  0                Prescriptions were sent or printed at these locations (1 Prescription)                   Other Prescriptions                 Printed at Department/Unit printer (1 of 1)         metoprolol succinate (TOPROL-XL) 25 MG 24 hr tablet                Procedures and tests performed during your visit     CBC with platelets differential    Cardiac Continuous Monitoring    Comprehensive metabolic panel    EKG 12-lead, tracing only    Head CT w/o contrast    Peripheral IV: Standard    Pulse oximetry nursing    Review medications with patient    Troponin I      Orders Needing Specimen Collection     None      Pending Results     Date and Time Order Name Status Description    2/3/2018 1657 Head CT w/o contrast Preliminary             Pending Culture Results     No orders found from 2/1/2018 to 2/4/2018.            Pending Results Instructions     If you had any lab results that were not finalized at the time of your Discharge, you can call the ED Lab Result RN at [phone]. You will be contacted by this team for any positive Lab results or changes in treatment. The nurses are available 7 days a week from 10A to 6:30P.  You can leave a message 24 hours per day and they will return your call.        Thank you for choosing [facility]       Thank you for choosing [facility] for your care. Our goal is always to provide you with excellent care. Hearing back from our patients is one way we can continue to improve our services. Please take a few minutes to complete the written survey that you may receive in the mail after you visit with us. Thank you!        [employer] Information     [employer] gives you secure access to your electronic health record. If you see a primary care provider, you can also send messages to your care team and make appointments. If you have questions, please call your primary care clinic.  If you do not have a primary care provider, please call [phone] and they will assist you.        Care EveryWhere ID     This is your Care EveryWhere ID. This could be used by other organizations to access your [facility]  records  [id number]        Equal Access to Services     PERCY ROMERO : Beto Black, edwina mahoney, ro ray. So [facility] [phone].    ATENCIÓN: Si habla español, tiene a slade disposición servicios gratuitos de asistencia lingüística. Llame al [phone].    We comply with applicable federal civil rights laws and [place] laws. We do not discriminate on the basis of race, color, national origin, age, disability, sex, sexual orientation, or gender identity.            After Visit Summary       This is your record. Keep this with you and show to your community pharmacist(s) and doctor(s) at your next visit.

## 2018-02-03 NOTE — ED NOTES
"Arrived to ED d/t c/o high blood pressure, has been having dizziness and headache, is supposed to be taking Metoprolol but has not been taking, was prescribed 2 months ago, hx of HTN, states hx of PVC's and feels \"anxious\" when they happen, went to his primary clinic yesterday and is supposed to see cardiologist but has not scheduled an appt yet, VSS upon arrival to triage  "

## 2018-02-03 NOTE — TELEPHONE ENCOUNTER
"Caller states PCp put him on antihypertensive one month ago but he has not taken any \" I don't trust my doctor\"    Today woke up feeling lightheaded; bought a  wrist blood pressure monitor and has had multiple high readings up to  200/110 and as low as 115/70   Triage protocol reviewed; no chest pain or dyspnea; feels light headed.  Advised ED assessment; reluctant to go in; took BP while being triaged and states it is now 115//80;   Advised ED assessment due to symptoms and past reading in office; suggested he go to local fire station for accurate  BP reading if he doubting his  result but still needed to go to ED.  Reason for Disposition    [1] BP  >= 160 / 100 AND [2] cardiac or neurologic symptoms    (e.g., chest pain, difficulty breathing, unsteady gait, blurred vision)    Protocols used: HIGH BLOOD PRESSURE-ADULT-  Shayy Salmeron RN  [facility]    "

## 2018-02-04 NOTE — DISCHARGE INSTRUCTIONS
Restart your metoprolol.  No work until 2/5/2018.    Please make an appointment to follow up with Your Primary Care Provider or our Primary Care Center (phone: [phone]) in one week for recheck.

## 2018-02-05 LAB — INTERPRETATION ECG - MUSE: NORMAL

## 2018-02-06 ENCOUNTER — TELEPHONE (OUTPATIENT)
Dept: FAMILY MEDICINE | Facility: CLINIC | Age: 43
End: 2018-02-06

## 2018-02-06 NOTE — TELEPHONE ENCOUNTER
Reason for Call:  High blood pressure    Detailed comments: Patient would like a call back to discuss his high blood pressure and medication.    Phone Number Patient can be reached at: Home number on file [phone] (home)    Best Time: any    Can we leave a detailed message on this number? YES    Call taken on 2/6/2018 at 10:18 AM by Angelica Kaplan

## 2018-02-06 NOTE — TELEPHONE ENCOUNTER
Patient was seen on ED for HTN on 2/3/18 after not taking metoprolol- see epic notes.  Patient is taking his Metoprolol now but his BP has been running around 150's about an hour after taking medication.  Asked patient if he has followed up with cardiology since OV with PCP on 2/2/18, patient does have visit tomorrow.  Patient wondering if he should add another dose?  Or wait for cardiology visit?  Please advise   Aurelia Magallanes RN

## 2018-02-07 ENCOUNTER — OFFICE VISIT (OUTPATIENT)
Dept: CARDIOLOGY | Facility: CLINIC | Age: 43
End: 2018-02-07
Payer: COMMERCIAL

## 2018-02-07 VITALS — DIASTOLIC BLOOD PRESSURE: 106 MMHG | OXYGEN SATURATION: 95 % | HEART RATE: 61 BPM | SYSTOLIC BLOOD PRESSURE: 149 MMHG

## 2018-02-07 DIAGNOSIS — I10 BENIGN ESSENTIAL HYPERTENSION: Primary | ICD-10-CM

## 2018-02-07 DIAGNOSIS — E11.9 TYPE 2 DIABETES MELLITUS WITHOUT COMPLICATION, WITHOUT LONG-TERM CURRENT USE OF INSULIN (H): ICD-10-CM

## 2018-02-07 DIAGNOSIS — E78.5 HYPERLIPIDEMIA DUE TO TYPE 2 DIABETES MELLITUS (H): ICD-10-CM

## 2018-02-07 DIAGNOSIS — E11.69 HYPERLIPIDEMIA DUE TO TYPE 2 DIABETES MELLITUS (H): ICD-10-CM

## 2018-02-07 DIAGNOSIS — I49.3 PVC'S (PREMATURE VENTRICULAR CONTRACTIONS): ICD-10-CM

## 2018-02-07 PROCEDURE — 99204 OFFICE O/P NEW MOD 45 MIN: CPT | Performed by: INTERNAL MEDICINE

## 2018-02-07 RX ORDER — LISINOPRIL/HYDROCHLOROTHIAZIDE 10-12.5 MG
1 TABLET ORAL DAILY
Qty: 90 TABLET | Refills: 3 | Status: SHIPPED | OUTPATIENT
Start: 2018-02-07 | End: 2018-02-14 | Stop reason: SINTOL

## 2018-02-07 ASSESSMENT — PAIN SCALES - GENERAL
PAINLEVEL: NO PAIN (0)
PAINLEVEL: NO PAIN (0)

## 2018-02-07 NOTE — NURSING NOTE
"Chief Complaint   Patient presents with     Hypertension     New pt appt with Dr. Subramanian for syncope, dizziness x 4-5 months. H/O PVC's and HTN, anxiety. Pt states he was at the [facility] ER for blood pressure last week. Denies any fainting spells believes all sx are related to high bp. Will feel foggy and dizzy with a headache when bp is high.  Can feel his heart pounding and skipping beats. Does report occ chest pain. Some fatigue in the last year. No sob.       Initial BP (!) 149/106 (BP Location: Left arm, Patient Position: Sitting, Cuff Size: Adult Regular)  Pulse 61  SpO2 95% Estimated body mass index is 36.05 kg/(m^2) as calculated from the following:    Height as of 1/18/18: 1.791 m (5' 10.5\").    Weight as of 2/3/18: 115.6 kg (254 lb 13.6 oz)..  BP completed using cuff size: regular    Chely Duarte CMA    "

## 2018-02-07 NOTE — PATIENT INSTRUCTIONS
1.  Dr. Manny Subramanian has prescribed a new medication(s) for you called, Lisinopril-HCTZ (hydrochlorothiazide) 10/12.5 mg, please take this medication once daily    Medication(s) has been faxed to the [facility] on [address].    2. Labs in 1 week: BMP. Scheduled at the [place] at 2:00pm.    3. Call us in 1 month with your blood pressure readings. Please keep track on the log sheet provided. You may call [phone] or [facility]art the info as well to Dr. Subramanian.    4. Labs: Fasting Lipids. Scheduled for 8/6/2018 at 9:00am. [facility].    5. Follow up with Dr. Manny Subramanian on Wednesday 8/8/2017 at 11:30am. [address].          [facility] Location    If you have any questions regarding to your visit please contact your care team:     Cardiology  Telephone Number   Tae Mckeon  Cardiology RN's.    Liz Duarte CMA [phone]    After hours: [phone].  [phone]   For scheduling appts:     [phone] or  [phone]    After hours: [phone]   For the Device Clinic (Pacemakers and ICD's)  RN's :  Mckayla Branch   During business hours: [phone]  After business hours:  [phone]- select option 4.      If you need a medication refill please contact your pharmacy.  Please allow 3 business days for your refill to be completed.    As always, Thank you for trusting us with your health care needs!  _____________________________________________________________________      Lisinopril, Hydrochlorothiazide Oral tablet  What is this medicine?  HYDROCHLOROTHIAZIDE; LISINOPRIL (alden droe klor oh THYE a zide; lyse IN oh pril) is a combination of a diuretic and an ACE inhibitor. It is used to treat high blood pressure.  This medicine may be used for other purposes; ask your health care provider or pharmacist if you have questions.  What should I tell my health care provider before I take this medicine?  They need to  know if you have any of these conditions:    bone marrow disease    decreased urine    heart or blood vessel disease    if you are on a special diet like a low salt diet    immune system problems, like lupus    kidney disease    liver disease    previous swelling of the tongue, face, or lips with difficulty breathing, difficulty swallowing, hoarseness, or tightening of the throat    recent heart attack or stroke    an unusual or allergic reaction to lisinopril, hydrochlorothiazide, sulfa drugs, other medicines, insect venom, foods, dyes, or preservatives    pregnant or trying to get pregnant    breast-feeding  How should I use this medicine?  Take this medicine by mouth with a glass of water. Follow the directions on the prescription label. You can take it with or without food. If it upsets your stomach, take it with food. Take your medicine at regular intervals. Do not take it more often than directed. Do not stop taking except on your doctor's advice.  Talk to your pediatrician regarding the use of this medicine in children. Special care may be needed.  Overdosage: If you think you have taken too much of this medicine contact a poison control center or emergency room at once.  NOTE: This medicine is only for you. Do not share this medicine with others.  What if I miss a dose?  If you miss a dose, take it as soon as you can. If it is almost time for your next dose, take only that dose. Do not take double or extra doses.  What may interact with this medicine?    barbiturates like phenobarbital    blood pressure medicines    corticosteroids like prednisone    diabetic medications    diuretics, especially triamterene, spironolactone or amiloride    lithium    NSAIDs like ibuprofen    potassium salts or potassium supplements    prescription pain medicines    skeletal muscle relaxants like tubocurarine    some cholesterol lowering medications like cholestyramine or colestipol  This list may not describe all possible  interactions. Give your health care provider a list of all the medicines, herbs, non-prescription drugs, or dietary supplements you use. Also tell them if you smoke, drink alcohol, or use illegal drugs. Some items may interact with your medicine.  What should I watch for while using this medicine?  Visit your doctor or health care professional for regular checks on your progress. Check your blood pressure as directed. Ask your doctor or health care professional what your blood pressure should be and when you should contact him or her. Call your doctor or health care professional if you notice an irregular or fast heart beat.  You must not get dehydrated. Ask your doctor or health care professional how much fluid you need to drink a day. Check with him or her if you get an attack of severe diarrhea, nausea and vomiting, or if you sweat a lot. The loss of too much body fluid can make it dangerous for you to take this medicine.  Women should inform their doctor if they wish to become pregnant or think they might be pregnant. There is a potential for serious side effects to an unborn child. Talk to your health care professional or pharmacist for more information.  You may get drowsy or dizzy. Do not drive, use machinery, or do anything that needs mental alertness until you know how this drug affects you. Do not stand or sit up quickly, especially if you are an older patient. This reduces the risk of dizzy or fainting spells. Alcohol can make you more drowsy and dizzy. Avoid alcoholic drinks.  This medicine may affect your blood sugar level. If you have diabetes, check with your doctor or health care professional before changing the dose of your diabetic medicine.  Avoid salt substitutes unless you are told otherwise by your doctor or health care professional.  This medicine can make you more sensitive to the sun. Keep out of the sun. If you cannot avoid being in the sun, wear protective clothing and use sunscreen. Do  not use sun lamps or tanning beds/booths.  Do not treat yourself for coughs, colds, or pain while you are taking this medicine without asking your doctor or health care professional for advice. Some ingredients may increase your blood pressure.  What side effects may I notice from receiving this medicine?  Side effects that you should report to your doctor or health care professional as soon as possible:    changes in vision    confusion, dizziness, light headedness or fainting spells    decreased amount of urine passed    difficulty breathing or swallowing, hoarseness, or tightening of the throat    eye pain    fast or irregular heart beat, palpitations, or chest pain    muscle cramps    nausea and vomiting    persistent dry cough    redness, blistering, peeling or loosening of the skin, including inside the mouth    stomach pain    swelling of your face, lips, tongue, hands, or feet    unusual rash, bleeding or bruising, or pinpoint red spots on the skin    worsened gout pain    yellowing of the eyes or skin  Side effects that usually do not require medical attention (report to your doctor or health care professional if they continue or are bothersome):    change in sex drive or performance    cough    headache  This list may not describe all possible side effects. Call your doctor for medical advice about side effects. You may report side effects to FDA at 2-376-FDA-3389.  Where should I keep my medicine?  Keep out of the reach of children.  Store at room temperature between 20 and 25 degrees C (68 and 77 degrees F). Protect from moisture and excessive light. Keep container tightly closed. Throw away any unused medicine after the expiration date.  NOTE:This sheet is a summary. It may not cover all possible information. If you have questions about this medicine, talk to your doctor, pharmacist, or health care provider. Copyright  2016 Gold Standard

## 2018-02-07 NOTE — LETTER
2/7/2018      RE: Wilfrid Vickers  [address] [address]       Dear Colleague,    Thank you for the opportunity to participate in the care of your patient, Wilfrid Vickers, at the [facility] HEART AT [facility]. Please see a copy of my visit note below.    Referring provider: Guzman Walter MD  Chief complaint: palpitations and high blood pressure    HPI: Mr. Wilfrid Vickers is a 42 year old  male with PMH significant for PVCs and hypertension.     has been lately feeling foggy in his head and lightheaded and have been measuring his BP higher than normal. He thinks his BP has been slowly creeping up over the last 3 years and during the last 6 months it has been higher than before. He has been to the ED on 2/3/2018 because he was feeling headache and his BP was 200/125 mmHg. He was not on any medication at that point. When he was in the ED his BP was 174/118 mmHg. Labetolol 10 mg IV was given and his BP came down to 135/98 mmHg and he was sent home with 25 mg of metoprolol. His head CT was normal. He started taking that since then however his BP yesterday was 161/110 mmHg in the morning and today he measured it 151/105 mmHg.     He has been feeling palpitations during the last 1.5 years and has been diagnosed with PVCs when he was [place]. He was on metoprolol for some time but did not benefit from that and stopped one month ago. He feels palpitations as skipped beats mostly aggrevated by caffeine and beer and he stopped drinking those. He exercises regularly and working to lose some weight. He feels very well during exercise, however after he stops exercising he starts feeling more skipped beats. He states he suddenly feels the onset of skipped beats. The frequency of the episodes varies, last week he felt skipped beats on 2 days of the week.     He denies exertional CP,  FELIX, LE edema, palpitations, PND, ortopnea or syncope. His HbA1c is 6.9 on 2/3/2018, but the patient refuses to get further treatment for that. He states the test was done without his consent. He states if he loses weight he will decrease his HbA1c. His LDL is 216 mg/dl and he again wants to exercise and lose weight and doesnot want to be on statin for now.He doesnot have any other prior cardiac history. He is a non-smoker. No FH of CAD or SCD    His echocardiogram from 2016 shows a structurally normal heart. I have also reviewed his ECG from 2/3/2018 which shows normal sinus rhythm, no premature beats.    Medications, personal, family, and social history reviewed with patient and revised.    PAST MEDICAL HISTORY:  Hypertension  PVCs    CURRENT MEDICATIONS:  Metoprolol 25 mg qday  Melatonin  Xanax as needed.    PAST SURGICAL HISTORY:  Past Surgical History:   Procedure Laterality Date     ARTHROPLASTY SHOULDER Left     torn labrum, rotator cuff repair       ALLERGIES:     Allergies   Allergen Reactions     Amoxicillin      Rash     Erythromycin      Vomiting       FAMILY HISTORY:  Family History   Problem Relation Age of Onset     CANCER Father      Prostate     Allergies Mother          SOCIAL HISTORY:  Social History   Substance Use Topics     Smoking status: Never Smoker     Smokeless tobacco: Never Used     Alcohol use Yes      Comment: 6 pack per weekend twice monthly       ROS:   Constitutional: No fever, chills, or sweats. Weight stable.   ENT: No visual disturbance, ear ache, epistaxis, sore throat.   Cardiovascular: As per HPI.   Respiratory: No cough, hemoptysis.    GI: No nausea, vomiting, hematemesis, melena, or hematochezia.   : No hematuria.   Integument: Negative.   Psychiatric: Negative.   Hematologic:  Easy bruising, no easy bleeding.  Neuro: Negative.   Endocrinology: No significant heat or cold intolerance   Musculoskeletal: No myalgia.    Exam:  BP (!) 149/106 (BP Location: Left arm, Patient  Position: Sitting, Cuff Size: Adult Regular)  Pulse 61  SpO2 95%  GENERAL APPEARANCE: healthy, alert and no distress  HEENT: no icterus, no central cyanosis  LYMPH/NECK: no adenopathy, no asymmetry, JVP not elevated, no carotid bruits.  RESPIRATORY: lungs clear to auscultation - no rales, rhonchi or wheezes, no use of accessory muscles, no retractions, respirations are unlabored, normal respiratory rate  CARDIOVASCULAR: regular rhythm, normal S1, S2, no S3 or S4 and no murmur, click or rub, precordium quiet with normal PMI.  GI: soft, non tender  EXTREMITIES: peripheral pulses normal, no edema  NEURO: alert and oriented to person/place/time, normal speech,and affect  VASC: Radial, dorsalis pedis and posterior tibialis pulses are normal in volumes and symmetric bilaterally.   SKIN: no ecchymoses, no rashes     I have reviewed the labs and personally reviewed the imaging below (EKG) and made my comment in the assessment and plan.    Labs:  CBC RESULTS:   Lab Results   Component Value Date    WBC 9.4 02/03/2018    RBC 5.03 02/03/2018    HGB 16.0 02/03/2018    HCT 47.7 02/03/2018    MCV 95 02/03/2018    MCH 31.8 02/03/2018    MCHC 33.5 02/03/2018    RDW 12.3 02/03/2018     02/03/2018       BMP RESULTS:  Lab Results   Component Value Date     02/03/2018    POTASSIUM 4.0 02/03/2018    CHLORIDE 104 02/03/2018    CO2 28 02/03/2018    ANIONGAP 9 02/03/2018     (H) 02/03/2018    BUN 14 02/03/2018    CR 1.04 02/03/2018    GFRESTIMATED 78 02/03/2018    GFRESTBLACK >90 02/03/2018    HEMANT 8.7 02/03/2018        Transthoracic Echocardiogram 9/16/2016  Left ventricular size is normal.  The Ejection Fraction is estimated at 50-55%.  Right ventricular function, chamber size, wall motion, and thickness are  normal.  Pulmonary artery systolic pressure cannot be assessed.  No pericardial effusion is present.  No significant valvular dysfunction noted.    EKG 2/3/2018 normal    Assessment and Plan:   Mr. Weirua Pal  Rancho is a 42 year old  male with PMH significant for PVCs and hypertension. His physical exam is normal. His BP is high in the office today.    1. Systemic hypertension: No well controlled. I will start lisinopril-HCTZ (10/12.5 mg) qday with BMP check in one week. He wanted to come off metoprolol since he doesnot think it effects his skipped beats. I have discontinued that. He will continue to track his BP and send us one week BP readings in one month time.    2. PVCs: He feels them from time to time. No syncope. No underlying structural heart disease. His CBC, electrolyes (Na, K) and TSH are all normal. Normal kidney function. PVCs have not been captured on ECG. I did not detect ectopic beats during my physical exam today. He has brought rhythm strip with him today which was taken 1.5 years ago in [place]. It shows frequent PVCs. I will control his BP first and then have him undergo Zio-PATCH monitoring if he is still symptomtatic of PVCs.    3. HbA1c: Measured 6.9 on 2/3/2019. He doesnot want any further investigation or treatment for DM.    4. Hyperlipidemia: His LDL is 216 mg/dl. He doesnot want to be on statin. He wants to exercise and lose weight. I will re-check his lipid profile in 6 months time.    Plan:  -Start lisinopril-HCTZ combination 10/12.5 mg  -Discontinue metoprolol  -BMP in one week (ACE inhibitor started today)  -My Chart message in one month with BP readings  -F/u in cardiology in 6 months time with repeat lipid profile.    It was my absolute pleasure to meet  in the office today. Please donot hesitate to contact me if you have any questions or concerns.    Manny CARTER MD  [facility] Division of Cardiology  Pager [phone]

## 2018-02-07 NOTE — PROGRESS NOTES
Referring provider: Guzman Walter MD  Chief complaint: palpitations and high blood pressure    HPI: Mr. Wilfrid Vickers is a 42 year old  male with PMH significant for PVCs and hypertension.     has been lately feeling foggy in his head and lightheaded and have been measuring his BP higher than normal. He thinks his BP has been slowly creeping up over the last 3 years and during the last 6 months it has been higher than before. He has been to the ED on 2/3/2018 because he was feeling headache and his BP was 200/125 mmHg. He was not on any medication at that point. When he was in the ED his BP was 174/118 mmHg. Labetolol 10 mg IV was given and his BP came down to 135/98 mmHg and he was sent home with 25 mg of metoprolol. His head CT was normal. He started taking that since then however his BP yesterday was 161/110 mmHg in the morning and today he measured it 151/105 mmHg.     He has been feeling palpitations during the last 1.5 years and has been diagnosed with PVCs when he was [place]. He was on metoprolol for some time but did not benefit from that and stopped one month ago. He feels palpitations as skipped beats mostly aggrevated by caffeine and beer and he stopped drinking those. He exercises regularly and working to lose some weight. He feels very well during exercise, however after he stops exercising he starts feeling more skipped beats. He states he suddenly feels the onset of skipped beats. The frequency of the episodes varies, last week he felt skipped beats on 2 days of the week.     He denies exertional CP, FELIX, LE edema, palpitations, PND, ortopnea or syncope. His HbA1c is 6.9 on 2/3/2018, but the patient refuses to get further treatment for that. He states the test was done without his consent. He states if he loses weight he will decrease his HbA1c. His LDL is 216 mg/dl and he again wants to exercise and lose weight and doesnot want to be on statin for now.He doesnot  have any other prior cardiac history. He is a non-smoker. No FH of CAD or SCD    His echocardiogram from 2016 shows a structurally normal heart. I have also reviewed his ECG from 2/3/2018 which shows normal sinus rhythm, no premature beats.    Medications, personal, family, and social history reviewed with patient and revised.    PAST MEDICAL HISTORY:  Hypertension  PVCs    CURRENT MEDICATIONS:  Metoprolol 25 mg qday  Melatonin  Xanax as needed.    PAST SURGICAL HISTORY:  Past Surgical History:   Procedure Laterality Date     ARTHROPLASTY SHOULDER Left     torn labrum, rotator cuff repair       ALLERGIES:     Allergies   Allergen Reactions     Amoxicillin      Rash     Erythromycin      Vomiting       FAMILY HISTORY:  Family History   Problem Relation Age of Onset     CANCER Father      Prostate     Allergies Mother          SOCIAL HISTORY:  Social History   Substance Use Topics     Smoking status: Never Smoker     Smokeless tobacco: Never Used     Alcohol use Yes      Comment: 6 pack per weekend twice monthly       ROS:   Constitutional: No fever, chills, or sweats. Weight stable.   ENT: No visual disturbance, ear ache, epistaxis, sore throat.   Cardiovascular: As per HPI.   Respiratory: No cough, hemoptysis.    GI: No nausea, vomiting, hematemesis, melena, or hematochezia.   : No hematuria.   Integument: Negative.   Psychiatric: Negative.   Hematologic:  Easy bruising, no easy bleeding.  Neuro: Negative.   Endocrinology: No significant heat or cold intolerance   Musculoskeletal: No myalgia.    Exam:  BP (!) 149/106 (BP Location: Left arm, Patient Position: Sitting, Cuff Size: Adult Regular)  Pulse 61  SpO2 95%  GENERAL APPEARANCE: healthy, alert and no distress  HEENT: no icterus, no central cyanosis  LYMPH/NECK: no adenopathy, no asymmetry, JVP not elevated, no carotid bruits.  RESPIRATORY: lungs clear to auscultation - no rales, rhonchi or wheezes, no use of accessory muscles, no retractions, respirations  are unlabored, normal respiratory rate  CARDIOVASCULAR: regular rhythm, normal S1, S2, no S3 or S4 and no murmur, click or rub, precordium quiet with normal PMI.  GI: soft, non tender  EXTREMITIES: peripheral pulses normal, no edema  NEURO: alert and oriented to person/place/time, normal speech,and affect  VASC: Radial, dorsalis pedis and posterior tibialis pulses are normal in volumes and symmetric bilaterally.   SKIN: no ecchymoses, no rashes     I have reviewed the labs and personally reviewed the imaging below (EKG) and made my comment in the assessment and plan.    Labs:  CBC RESULTS:   Lab Results   Component Value Date    WBC 9.4 02/03/2018    RBC 5.03 02/03/2018    HGB 16.0 02/03/2018    HCT 47.7 02/03/2018    MCV 95 02/03/2018    MCH 31.8 02/03/2018    MCHC 33.5 02/03/2018    RDW 12.3 02/03/2018     02/03/2018       BMP RESULTS:  Lab Results   Component Value Date     02/03/2018    POTASSIUM 4.0 02/03/2018    CHLORIDE 104 02/03/2018    CO2 28 02/03/2018    ANIONGAP 9 02/03/2018     (H) 02/03/2018    BUN 14 02/03/2018    CR 1.04 02/03/2018    GFRESTIMATED 78 02/03/2018    GFRESTBLACK >90 02/03/2018    HEMANT 8.7 02/03/2018        Transthoracic Echocardiogram 9/16/2016  Left ventricular size is normal.  The Ejection Fraction is estimated at 50-55%.  Right ventricular function, chamber size, wall motion, and thickness are  normal.  Pulmonary artery systolic pressure cannot be assessed.  No pericardial effusion is present.  No significant valvular dysfunction noted.    EKG 2/3/2018 normal    Assessment and Plan:   Mr. Wilfrid Vickers is a 42 year old  male with PMH significant for PVCs and hypertension. His physical exam is normal. His BP is high in the office today.    1. Systemic hypertension: No well controlled. I will start lisinopril-HCTZ (10/12.5 mg) qday with BMP check in one week. He wanted to come off metoprolol since he doesnot think it effects his skipped beats. I have  discontinued that. He will continue to track his BP and send us one week BP readings in one month time.    2. PVCs: He feels them from time to time. No syncope. No underlying structural heart disease. His CBC, electrolyes (Na, K) and TSH are all normal. Normal kidney function. PVCs have not been captured on ECG. I did not detect ectopic beats during my physical exam today. He has brought rhythm strip with him today which was taken 1.5 years ago in [place]. It shows frequent PVCs. I will control his BP first and then have him undergo Zio-PATCH monitoring if he is still symptomtatic of PVCs.    3. HbA1c: Measured 6.9 on 2/3/2019. He doesnot want any further investigation or treatment for DM.    4. Hyperlipidemia: His LDL is 216 mg/dl. He doesnot want to be on statin. He wants to exercise and lose weight. I will re-check his lipid profile in 6 months time.    Plan:  -Start lisinopril-HCTZ combination 10/12.5 mg  -Discontinue metoprolol  -BMP in one week (ACE inhibitor started today)  -My Chart message in one month with BP readings  -F/u in cardiology in 6 months time with repeat lipid profile.    It was my absolute pleasure to meet  in the office today. Please donot hesitate to contact me if you have any questions or concerns.    Manny CARTER MD  [facility] Division of Cardiology  Pager [phone]

## 2018-02-07 NOTE — MR AVS SNAPSHOT
After Visit Summary   2/7/2018    Wilfrid Vickers    MRN: [id number]           Patient Information     Date Of Birth          1975        Visit Information        Provider Department      2/7/2018 1:30 PM Manny Subramanian MD [facility] PHYSICIANS HEART AT [facility]        Today's Diagnoses     Benign essential hypertension    -  1      Care Instructions    1.  Dr. Manny Subramanian has prescribed a new medication(s) for you called, Lisinopril-HCTZ (hydrochlorothiazide) 10/12.5 mg, please take this medication once daily    Medication(s) has been faxed to the [facility] on [facility].    2. Labs in 1 week: BMP. Scheduled at the [facility] at 2:00pm.    3. Call us in 1 month with your blood pressure readings. Please keep track on the log sheet provided. You may call [phone] or MyChart the info as well to Dr. Subramanian.    4. Labs: Fasting Lipids. Scheduled for 8/6/2018 at 9:00am. [facility].    5. Follow up with Dr. Manny Subramanian on Wednesday 8/8/2017 at 11:30am. [address].          [facility]    If you have any questions regarding to your visit please contact your care team:     Cardiology  Telephone Number   Tae Mckeon  Cardiology RN's.    Liz Duarte CMA [phone]    After hours: [phone].  [phone]   For scheduling appts:     [phone] or  [phone]    After hours: [phone]   For the Device Clinic (Pacemakers and ICD's)  RN's :  Mckayla Branch   During business hours: [phone]  After business hours:  [phone]- select option 4.      If you need a medication refill please contact your pharmacy.  Please allow 3 business days for your refill to be completed.    As always, Thank you for trusting us with your health care needs!  _____________________________________________________________________      Lisinopril, Hydrochlorothiazide Oral  tablet  What is this medicine?  HYDROCHLOROTHIAZIDE; LISINOPRIL (alden droe klor oh THYE a zide; lyse IN oh pril) is a combination of a diuretic and an ACE inhibitor. It is used to treat high blood pressure.  This medicine may be used for other purposes; ask your health care provider or pharmacist if you have questions.  What should I tell my health care provider before I take this medicine?  They need to know if you have any of these conditions:    bone marrow disease    decreased urine    heart or blood vessel disease    if you are on a special diet like a low salt diet    immune system problems, like lupus    kidney disease    liver disease    previous swelling of the tongue, face, or lips with difficulty breathing, difficulty swallowing, hoarseness, or tightening of the throat    recent heart attack or stroke    an unusual or allergic reaction to lisinopril, hydrochlorothiazide, sulfa drugs, other medicines, insect venom, foods, dyes, or preservatives    pregnant or trying to get pregnant    breast-feeding  How should I use this medicine?  Take this medicine by mouth with a glass of water. Follow the directions on the prescription label. You can take it with or without food. If it upsets your stomach, take it with food. Take your medicine at regular intervals. Do not take it more often than directed. Do not stop taking except on your doctor's advice.  Talk to your pediatrician regarding the use of this medicine in children. Special care may be needed.  Overdosage: If you think you have taken too much of this medicine contact a poison control center or emergency room at once.  NOTE: This medicine is only for you. Do not share this medicine with others.  What if I miss a dose?  If you miss a dose, take it as soon as you can. If it is almost time for your next dose, take only that dose. Do not take double or extra doses.  What may interact with this medicine?    barbiturates like phenobarbital    blood pressure  medicines    corticosteroids like prednisone    diabetic medications    diuretics, especially triamterene, spironolactone or amiloride    lithium    NSAIDs like ibuprofen    potassium salts or potassium supplements    prescription pain medicines    skeletal muscle relaxants like tubocurarine    some cholesterol lowering medications like cholestyramine or colestipol  This list may not describe all possible interactions. Give your health care provider a list of all the medicines, herbs, non-prescription drugs, or dietary supplements you use. Also tell them if you smoke, drink alcohol, or use illegal drugs. Some items may interact with your medicine.  What should I watch for while using this medicine?  Visit your doctor or health care professional for regular checks on your progress. Check your blood pressure as directed. Ask your doctor or health care professional what your blood pressure should be and when you should contact him or her. Call your doctor or health care professional if you notice an irregular or fast heart beat.  You must not get dehydrated. Ask your doctor or health care professional how much fluid you need to drink a day. Check with him or her if you get an attack of severe diarrhea, nausea and vomiting, or if you sweat a lot. The loss of too much body fluid can make it dangerous for you to take this medicine.  Women should inform their doctor if they wish to become pregnant or think they might be pregnant. There is a potential for serious side effects to an unborn child. Talk to your health care professional or pharmacist for more information.  You may get drowsy or dizzy. Do not drive, use machinery, or do anything that needs mental alertness until you know how this drug affects you. Do not stand or sit up quickly, especially if you are an older patient. This reduces the risk of dizzy or fainting spells. Alcohol can make you more drowsy and dizzy. Avoid alcoholic drinks.  This medicine may affect  your blood sugar level. If you have diabetes, check with your doctor or health care professional before changing the dose of your diabetic medicine.  Avoid salt substitutes unless you are told otherwise by your doctor or health care professional.  This medicine can make you more sensitive to the sun. Keep out of the sun. If you cannot avoid being in the sun, wear protective clothing and use sunscreen. Do not use sun lamps or tanning beds/booths.  Do not treat yourself for coughs, colds, or pain while you are taking this medicine without asking your doctor or health care professional for advice. Some ingredients may increase your blood pressure.  What side effects may I notice from receiving this medicine?  Side effects that you should report to your doctor or health care professional as soon as possible:    changes in vision    confusion, dizziness, light headedness or fainting spells    decreased amount of urine passed    difficulty breathing or swallowing, hoarseness, or tightening of the throat    eye pain    fast or irregular heart beat, palpitations, or chest pain    muscle cramps    nausea and vomiting    persistent dry cough    redness, blistering, peeling or loosening of the skin, including inside the mouth    stomach pain    swelling of your face, lips, tongue, hands, or feet    unusual rash, bleeding or bruising, or pinpoint red spots on the skin    worsened gout pain    yellowing of the eyes or skin  Side effects that usually do not require medical attention (report to your doctor or health care professional if they continue or are bothersome):    change in sex drive or performance    cough    headache  This list may not describe all possible side effects. Call your doctor for medical advice about side effects. You may report side effects to FDA at 0-030-FDA-5751.  Where should I keep my medicine?  Keep out of the reach of children.  Store at room temperature between 20 and 25 degrees C (68 and 77 degrees  F). Protect from moisture and excessive light. Keep container tightly closed. Throw away any unused medicine after the expiration date.  NOTE:This sheet is a summary. It may not cover all possible information. If you have questions about this medicine, talk to your doctor, pharmacist, or health care provider. Copyright  2016 [employer] Standard                Follow-ups after your visit        Your next 10 appointments already scheduled     Feb 14, 2018  9:00 AM CST   (Arrive by 8:30 AM)   New Visit with ABE Ricardo   [facility] ([facility])    [address]  [place] [address]   [phone]            Feb 14, 2018  2:00 PM CST   LAB with CP LAB   [facility]    [address] [place] [id number]   [phone]           Please do not eat 10-12 hours before your appointment if you are coming in fasting for labs on lipids, cholesterol, or glucose (sugar). This does not apply to pregnant women. Water, hot tea and black coffee (with nothing added) are okay. Do not drink other fluids, diet soda or chew gum.            Aug 06, 2018  9:00 AM CDT   LAB with CP LAB   [facility] ([facility])    [address] [place] [id number]   [phone]           Please do not eat 10-12 hours before your appointment if you are coming in fasting for labs on lipids, cholesterol, or glucose (sugar). This does not apply to pregnant women. Water, hot tea and black coffee (with nothing added) are okay. Do not drink other fluids, diet soda or chew gum.              Future tests that were ordered for you today     Open Future Orders        Priority Expected Expires Ordered    Basic metabolic panel Routine 2/14/2018 2/28/2018 2/7/2018            Who to contact     If you have questions or need follow up information about today's clinic  visit or your schedule please contact [facility] PHYSICIANS HEART AT [facility] directly at [phone].  Normal or non-critical lab and imaging results will be communicated to you by MyChart, letter or phone within 4 business days after the clinic has received the results. If you do not hear from us within 7 days, please contact the clinic through i[employer]cer[employer] or phone. If you have a critical or abnormal lab result, we will notify you by phone as soon as possible.  Submit refill requests through [employer] or call your pharmacy and they will forward the refill request to us. Please allow 3 business days for your refill to be completed.          Additional Information About Your Visit        i[employer]cer[employer] Information     [employer] gives you secure access to your electronic health record. If you see a primary care provider, you can also send messages to your care team and make appointments. If you have questions, please call your primary care clinic.  If you do not have a primary care provider, please call [phone] and they will assist you.        Care EveryWhere ID     This is your Care EveryWhere ID. This could be used by other organizations to access your [facility] medical records  [id number]         Blood Pressure from Last 3 Encounters:   02/03/18 (!) 135/98   01/18/18 (!) 172/102   11/28/17 146/86    Weight from Last 3 Encounters:   02/03/18 115.6 kg (254 lb 13.6 oz)   01/18/18 114.8 kg (253 lb)   11/28/17 112.9 kg (249 lb)                 Today's Medication Changes          These changes are accurate as of 2/7/18  3:01 PM.  If you have any questions, ask your nurse or doctor.               Start taking these medicines.        Dose/Directions    lisinopril-hydrochlorothiazide 10-12.5 MG per tablet   Commonly known as:  PRINZIDE/ZESTORETIC   Used for:  Benign essential hypertension   Started by:  Manny Subramanian MD        Dose:  1 tablet   Take 1 tablet by mouth daily   Quantity:  90 tablet   Refills:  3          Stop taking these medicines if you haven't already. Please contact your care team if you have questions.     metoprolol succinate 25 MG 24 hr tablet   Commonly known as:  TOPROL-XL   Stopped by:  Manny Subramanian MD                Where to get your medicines      These medications were sent to [id number] - [place], [place] - [address] AT [facility] [address]  [address], [facility] [address]     Phone:  [phone]     lisinopril-hydrochlorothiazide 10-12.5 MG per tablet                Primary Care Provider Office Phone # Fax #    Guzman Walter [phone] [address] [address]        Equal Access to Services     PERCY ROMERO : Hadii aad ku hadasho Soaracelis, waaxda luqadaha, qaybta kaalmada adeegyada, ro mills . So [facility] [phone].    ATENCIÓN: Si habla español, tiene a slade disposición servicios gratuitos de asistencia lingüística. [facility] [phone].    We comply with applicable federal civil rights laws and [place] laws. We do not discriminate on the basis of race, color, national origin, age, disability, sex, sexual orientation, or gender identity.            Thank you!     Thank you for choosing [facility] HEART AT [facility]  for your care. Our goal is always to provide you with excellent care. Hearing back from our patients is one way we can continue to improve our services. Please take a few minutes to complete the written survey that you may receive in the mail after your visit with us. Thank you!             Your Updated Medication List - Protect others around you: Learn how to safely use, store and throw away your medicines at www.disposemymeds.org.          This list is accurate as of 2/7/18  3:01 PM.  Always use your most recent med list.                   Brand Name Dispense Instructions for use Diagnosis    ALPRAZolam 0.5 MG tablet     XANAX    20 tablet    Take 1 tablet (0.5 mg) by mouth 3 times daily as needed for anxiety (As needed 15mins prior to take-off)    EILEEN (generalized anxiety disorder), Fear of flying       lisinopril-hydrochlorothiazide 10-12.5 MG per tablet    PRINZIDE/ZESTORETIC    90 tablet    Take 1 tablet by mouth daily    Benign essential hypertension       MELATONIN PO

## 2018-02-08 ENCOUNTER — DOCUMENTATION ONLY (OUTPATIENT)
Dept: FAMILY MEDICINE | Facility: CLINIC | Age: 43
End: 2018-02-08

## 2018-02-08 NOTE — NURSING NOTE
Late entry:   Yesterday while in clinic with Patient, upon review of pt's social history he demanded I remove the use of Methamphetamine from his chart.  Chely Duarte CMA.

## 2018-02-09 ENCOUNTER — DOCUMENTATION ONLY (OUTPATIENT)
Dept: FAMILY MEDICINE | Facility: CLINIC | Age: 43
End: 2018-02-09

## 2018-02-09 NOTE — PROGRESS NOTES
Patient came in to the clinic today to question his medical record.  He states there was a discrepancy about his using a particular drug in the past and that he wanted this removed from his record.  I told him I could write a note amending this to give his viewpoint to correct the record but that I could not change the record.     He does not have the note written anywhere in the chart other than a single note that I had written   7/28/2017    He became verbally abusive and he left and then came back and I reviewed the chart with him

## 2018-02-12 DIAGNOSIS — I10 BENIGN ESSENTIAL HYPERTENSION: ICD-10-CM

## 2018-02-12 LAB
ANION GAP SERPL CALCULATED.3IONS-SCNC: 6 MMOL/L (ref 3–14)
BUN SERPL-MCNC: 14 MG/DL (ref 7–30)
CALCIUM SERPL-MCNC: 9.3 MG/DL (ref 8.5–10.1)
CHLORIDE SERPL-SCNC: 105 MMOL/L (ref 94–109)
CO2 SERPL-SCNC: 28 MMOL/L (ref 20–32)
CREAT SERPL-MCNC: 1.07 MG/DL (ref 0.66–1.25)
GFR SERPL CREATININE-BSD FRML MDRD: 76 ML/MIN/1.7M2
GLUCOSE SERPL-MCNC: 149 MG/DL (ref 70–99)
POTASSIUM SERPL-SCNC: 4.1 MMOL/L (ref 3.4–5.3)
SODIUM SERPL-SCNC: 139 MMOL/L (ref 133–144)

## 2018-02-12 PROCEDURE — 80048 BASIC METABOLIC PNL TOTAL CA: CPT | Performed by: INTERNAL MEDICINE

## 2018-02-12 PROCEDURE — 36415 COLL VENOUS BLD VENIPUNCTURE: CPT | Performed by: INTERNAL MEDICINE

## 2018-02-13 ENCOUNTER — MYC MEDICAL ADVICE (OUTPATIENT)
Dept: FAMILY MEDICINE | Facility: CLINIC | Age: 43
End: 2018-02-13

## 2018-02-14 ENCOUNTER — OFFICE VISIT (OUTPATIENT)
Dept: PSYCHOLOGY | Facility: CLINIC | Age: 43
End: 2018-02-14
Attending: FAMILY MEDICINE
Payer: COMMERCIAL

## 2018-02-14 ENCOUNTER — TELEPHONE (OUTPATIENT)
Dept: CARDIOLOGY | Facility: CLINIC | Age: 43
End: 2018-02-14

## 2018-02-14 DIAGNOSIS — I10 BENIGN ESSENTIAL HYPERTENSION: Primary | ICD-10-CM

## 2018-02-14 DIAGNOSIS — F41.9 ANXIETY DISORDER, UNSPECIFIED TYPE: Primary | ICD-10-CM

## 2018-02-14 PROCEDURE — 90834 PSYTX W PT 45 MINUTES: CPT | Performed by: MARRIAGE & FAMILY THERAPIST

## 2018-02-14 RX ORDER — AMLODIPINE AND BENAZEPRIL HYDROCHLORIDE 5; 10 MG/1; MG/1
1 CAPSULE ORAL DAILY
Qty: 30 CAPSULE | Refills: 1 | Status: SHIPPED | OUTPATIENT
Start: 2018-02-14 | End: 2018-03-08

## 2018-02-14 NOTE — TELEPHONE ENCOUNTER
Spoke to Patient. Discussed with him that Dr. Manny Subramanian has been added to the care team under the registration tab, so she should pull up in the future for an option for him to my chart info to the cardiology team.  Patient verbalizes understanding to this.    Patient would like to try a different medication for the loose stool/diarrhea side effects from the Lisinopril-HCTZ medication. States his blood pressures have been a little bit better overall since taking the med, but does want to try something else.    Dr. Subramanian please advise a new medication for Patient to start . Patient wants sent to  [facility] [address] [place], [place] - [address] AT [facility] [address].    Thank you,  Chely Duarte CMA.

## 2018-02-14 NOTE — PROGRESS NOTES
Progress Note - Initial Session    Client Name:  Wilfrid Vickers Date: 2/14/18         Service Type: Individual      Session Start Time: 9:00  Session End Time: 9:52      Session Length: 38 - 52      Session #: 1     Attendees: Client attended alone         Diagnostic Assessment in progress.  Unable to complete documentation at the conclusion of the first session due to intake packet not filled out prior to session.  Client reported seeking therapy to help with coping skills for anxiety.  Client reported feeling lack of trust with medical professionals due to previous doctor having recorded inaccurate information in client's medical records.  Client decided to pursue therapy to address said anxiety issues.      Mental Status Assessment:  Appearance:   Appropriate   Eye Contact:   Good   Psychomotor Behavior: Normal   Attitude:   Cooperative  Guarded  Suspicious   Orientation:   All  Speech   Rate / Production: Normal    Volume:  Normal   Mood:    Angry  Anxious  Normal  Affect:    Appropriate   Thought Content:  Clear   Thought Form:  Coherent  Logical   Insight:    Good       Safety Issues and Plan for Safety and Risk Management:  Client denies current fears or concerns for personal safety.  Client denies current or recent suicidal ideation or behaviors.  Client denies current or recent homicidal ideation or behaviors.  Client denies current or recent self injurious behavior or ideation.  Client denies other safety concerns.  A safety and risk management plan has not been developed at this time, however client was given the after-hours number / 911 should there be a change in any of these risk factors.  Client reports there are no firearms in the house.      Diagnostic Criteria:  Anxiety disorder is present, but at this time therapist is unable to determine whether it is primary.  Further assessment needed.  Client reports the following symptoms of anxiety:   - The person finds it difficult to  control the worry.   - Irritability.    - Sleep disturbance (difficulty falling or staying asleep, or restless unsatisfying sleep).    - The focus of the anxiety and worry is not confined to features of an Axis I disorder.   - The anxiety, worry, or physical symptoms cause clinically significant distress or impairment in social, occupational, or other important areas of functioning.    - The disturbance is not due to the direct physiological effects of a substance (e.g., a drug of abuse, a medication) or a general medical condition (e.g., hyperthyroidism) and does not occur exclusively during a Mood Disorder, a Psychotic Disorder, or a Pervasive Developmental Disorder.    - The aformentioned symptoms began 5 year(s) ago and occurs 4 days per week and is experienced as moderate.        DSM5 Diagnoses: (Sustained by DSM5 Criteria Listed Above)  Diagnoses: 300.00 (F41.9) Unspecified Anxiety Disorder  Psychosocial & Contextual Factors: medical records dispute  WHODAS 2.0 (12 item) will be completed at next session.    Collateral Reports Completed:  Not Applicable      PLAN: (Homework, other):  Client scheduled follow-up ongoing services with [facility].  Client agreed to bring completed intake form to follow-up session next week.      ABE Ricardo

## 2018-02-14 NOTE — TELEPHONE ENCOUNTER
"\"I have discontinued lisinopril-hctz, and started him on benazepril-amlodipin combination. I hope this helps.   \"      Dr. Moris Weir has changed your blood pressure medication to Benazepril-amlodipine combo. Please allow a couple of hours for you pharmacy to get this filled. Please continue to check your blood pressures and let us know how they are doing in a few weeks. Please let us know of any side effects associated with this new medication.    Have a nice day,  Chely Duarte CMA.  Cardiology Dept.  "

## 2018-02-14 NOTE — TELEPHONE ENCOUNTER
Reason for call:  Other   Patient called regarding (reason for call): returning call  Additional comments: Patient rec'd a call while driving and was returning the call. Please try again.    Phone number to reach patient:  Home number on file [phone] (home)    Best Time:  Anytime    Can we leave a detailed message on this number?  YES

## 2018-02-14 NOTE — TELEPHONE ENCOUNTER
Please see [employer] message below and advise. Thanks.    Tiffanie Flores RN  [facility]

## 2018-03-07 DIAGNOSIS — I10 BENIGN ESSENTIAL HYPERTENSION: ICD-10-CM

## 2018-03-08 ENCOUNTER — TELEPHONE (OUTPATIENT)
Dept: CARDIOLOGY | Facility: CLINIC | Age: 43
End: 2018-03-08

## 2018-03-08 ENCOUNTER — MYC MEDICAL ADVICE (OUTPATIENT)
Dept: CARDIOLOGY | Facility: CLINIC | Age: 43
End: 2018-03-08

## 2018-03-08 DIAGNOSIS — I10 BENIGN ESSENTIAL HYPERTENSION: Primary | ICD-10-CM

## 2018-03-08 DIAGNOSIS — I10 BENIGN ESSENTIAL HYPERTENSION: ICD-10-CM

## 2018-03-08 LAB
ANION GAP SERPL CALCULATED.3IONS-SCNC: 6 MMOL/L (ref 3–14)
BUN SERPL-MCNC: 17 MG/DL (ref 7–30)
CALCIUM SERPL-MCNC: 9 MG/DL (ref 8.5–10.1)
CHLORIDE SERPL-SCNC: 109 MMOL/L (ref 94–109)
CO2 SERPL-SCNC: 28 MMOL/L (ref 20–32)
CREAT SERPL-MCNC: 1.18 MG/DL (ref 0.66–1.25)
GFR SERPL CREATININE-BSD FRML MDRD: 68 ML/MIN/1.7M2
GLUCOSE SERPL-MCNC: 124 MG/DL (ref 70–99)
POTASSIUM SERPL-SCNC: 4.3 MMOL/L (ref 3.4–5.3)
SODIUM SERPL-SCNC: 143 MMOL/L (ref 133–144)

## 2018-03-08 PROCEDURE — 80048 BASIC METABOLIC PNL TOTAL CA: CPT | Performed by: INTERNAL MEDICINE

## 2018-03-08 PROCEDURE — 36415 COLL VENOUS BLD VENIPUNCTURE: CPT | Performed by: INTERNAL MEDICINE

## 2018-03-08 RX ORDER — AMLODIPINE AND BENAZEPRIL HYDROCHLORIDE 5; 20 MG/1; MG/1
1 CAPSULE ORAL DAILY
Qty: 30 CAPSULE | Refills: 3 | Status: SHIPPED | OUTPATIENT
Start: 2018-03-08 | End: 2018-07-17

## 2018-03-09 NOTE — TELEPHONE ENCOUNTER
Spoke to pt. Pt to make medication change starting tomorrow and scheduled lab for 5/23/18 at 9:30am in [place].  Pt verbalized understanding.    Tae Jackson RN

## 2018-03-28 DIAGNOSIS — I10 BENIGN ESSENTIAL HYPERTENSION: ICD-10-CM

## 2018-03-28 LAB
ANION GAP SERPL CALCULATED.3IONS-SCNC: 11 MMOL/L (ref 3–14)
BUN SERPL-MCNC: 16 MG/DL (ref 7–30)
CALCIUM SERPL-MCNC: 9.7 MG/DL (ref 8.5–10.1)
CHLORIDE SERPL-SCNC: 105 MMOL/L (ref 94–109)
CO2 SERPL-SCNC: 24 MMOL/L (ref 20–32)
CREAT SERPL-MCNC: 1.05 MG/DL (ref 0.66–1.25)
GFR SERPL CREATININE-BSD FRML MDRD: 78 ML/MIN/1.7M2
GLUCOSE SERPL-MCNC: 148 MG/DL (ref 70–99)
POTASSIUM SERPL-SCNC: 4 MMOL/L (ref 3.4–5.3)
SODIUM SERPL-SCNC: 140 MMOL/L (ref 133–144)

## 2018-03-28 PROCEDURE — 36415 COLL VENOUS BLD VENIPUNCTURE: CPT | Performed by: INTERNAL MEDICINE

## 2018-03-28 PROCEDURE — 80048 BASIC METABOLIC PNL TOTAL CA: CPT | Performed by: INTERNAL MEDICINE

## 2018-04-19 ENCOUNTER — OFFICE VISIT (OUTPATIENT)
Dept: FAMILY MEDICINE | Facility: CLINIC | Age: 43
End: 2018-04-19
Payer: COMMERCIAL

## 2018-04-19 ENCOUNTER — RADIANT APPOINTMENT (OUTPATIENT)
Dept: GENERAL RADIOLOGY | Facility: CLINIC | Age: 43
End: 2018-04-19
Attending: FAMILY MEDICINE
Payer: COMMERCIAL

## 2018-04-19 VITALS
RESPIRATION RATE: 18 BRPM | DIASTOLIC BLOOD PRESSURE: 88 MMHG | BODY MASS INDEX: 34.35 KG/M2 | OXYGEN SATURATION: 98 % | SYSTOLIC BLOOD PRESSURE: 144 MMHG | TEMPERATURE: 96.9 F | WEIGHT: 242.8 LBS | HEART RATE: 70 BPM

## 2018-04-19 DIAGNOSIS — R07.89 ATYPICAL CHEST PAIN: Primary | ICD-10-CM

## 2018-04-19 DIAGNOSIS — R07.89 ATYPICAL CHEST PAIN: ICD-10-CM

## 2018-04-19 PROCEDURE — 71046 X-RAY EXAM CHEST 2 VIEWS: CPT

## 2018-04-19 PROCEDURE — 99213 OFFICE O/P EST LOW 20 MIN: CPT | Performed by: FAMILY MEDICINE

## 2018-04-19 NOTE — MR AVS SNAPSHOT
After Visit Summary   4/19/2018    Wilfrid Vickers    MRN: [id number]           Patient Information     Date Of Birth          1975        Visit Information        Provider Department      4/19/2018 10:20 AM Guzman Walter MD [facility]        Today's Diagnoses     Atypical chest pain    -  1      Care Instructions      Chest Wall Pain: Costochondritis    The chest pain that you have had today is caused by costochondritis. This condition is caused by an inflammation of the cartilage joining your ribs to your breastbone. It is not caused by heart or lung problems. Your healthcare team has made sure that the chest pain you feel is not from a life threatening cause of chest pain such as heart attack, collapsed lung, blood clot in the lung, tear in the aorta, or esophageal rupture. The inflammation may have been brought on by a blow to the chest, lifting heavy objects, intense exercise, or an illness that made you cough and sneeze a lot. It often occurs during times of emotional stress. It can be painful, but it is not dangerous. It usually goes away in 1 to 2 weeks. But it may happen again. Rarely, a more serious condition may cause symptoms similar to costochondritis. That s why it s important to watch for the warning signs listed below.  Home care  Follow these guidelines when caring for yourself at home:    If you feel that emotional stress is a cause of your condition, try to figure out the sources of that stress. It may not be obvious. Learn ways to deal with the stress in your life. This can include regular exercise, muscle relaxation, meditation, or simply taking time out for yourself.    You may use acetaminophen, ibuprofen, or naproxen to control pain, unless another pain medicine was prescribed. If you have liver or kidney disease or ever had a stomach ulcer, talk with your healthcare provider before using these medicines.    You can also help ease pain  by using a hot, wet compress or heating pad. Use this with or without a medicated skin cream that helps relieves pain.    Do stretching exercise as advised by your provider.    Take any prescribed medicines as directed.  Follow-up care  Follow up with your healthcare provider, or as advised, if you do not start to get better in the next 2 days.  When to seek medical advice  Call your healthcare provider right away if any of these occur:    A change in the type of pain. Call if it feels different, becomes more serious, lasts longer, or spreads into your shoulder, arm, neck, jaw, or back.    Shortness of breath or pain gets worse when you breathe    Weakness, dizziness, or fainting    Cough with dark-colored sputum (phlegm) or blood    Abdominal pain    Dark red or black stools    Fever of 100.4 F (38 C) or higher, or as directed by your healthcare provider  Date Last Reviewed: 12/1/2016 2000-2017 [employer]. [address] [place] [address]. All rights reserved. This information is not intended as a substitute for professional medical care. Always follow your healthcare professional's instructions.      [facility]    If you have any questions regarding to your visit please contact your care team:       Team Purple:   Clinic Hours Telephone Number   Dr. Hailee Hammonds   7am-7pm  Monday - Thursday   7am-5pm  Fridays  [phone]  (Appointment scheduling available 24/7)    Questions about your Visit?   Team Line:  [phone]   [facility] [place] [facility] - 11am-9pm Monday-Friday Saturday-Sunday- 9am-5pm   [place] - 5pm-9pm Monday-Friday Saturday-Sunday- 9am-5pm  [phone] - [place]   [phone] - [place]       What options do I have for visits at the clinic other than the traditional office visit?  To expand how we care for you, many of our providers are utilizing electronic visits  (e-visits) and telephone visits, when medically appropriate, for interactions with their patients rather than a visit in the clinic.   We also offer nurse visits for many medical concerns. Just like any other service, we will bill your insurance company for this type of visit based on time spent on the phone with your provider. Not all insurance companies cover these visits. Please check with your medical insurance if this type of visit is covered. You will be responsible for any charges that are not paid by your insurance.      E-visits via [employer]:  generally incur a $35.00 fee.  Telephone visits:  Time spent on the phone: *charged based on time that is spent on the phone in increments of 10 minutes. Estimated cost:   5-10 mins $30.00   11-20 mins. $59.00   21-30 mins. $85.00     Use [employer] (secure email communication and access to your chart) to send your primary care provider a message or make an appointment. Ask someone on your Team how to sign up for [employer].  For a Price Quote for your services, please call our [employer] Line at [phone].  As always, Thank you for trusting us with your health care needs!    Shantell Dallas MA            Follow-ups after your visit        Your next 10 appointments already scheduled     Aug 06, 2018  9:00 AM CDT   LAB with CP LAB   [facility]    [address] [place] [id number] [phone]           Please do not eat 10-12 hours before your appointment if you are coming in fasting for labs on lipids, cholesterol, or glucose (sugar). This does not apply to pregnant women. Water, hot tea and black coffee (with nothing added) are okay. Do not drink other fluids, diet soda or chew gum.            Aug 08, 2018 11:30 AM CDT   Return Visit with Manny Subramanian MD   [facility] ([facility]    [address] [place] [address]  [place] [id number] [phone]              Who to contact     If you have questions or need follow up information about today's clinic visit or your schedule please contact [facility] directly at [phone].  Normal or non-critical lab and imaging results will be communicated to you by MyChart, letter or phone within 4 business days after the clinic has received the results. If you do not hear from us within 7 days, please contact the clinic through [employer] or phone. If you have a critical or abnormal lab result, we will notify you by phone as soon as possible.  Submit refill requests through [employer] or call your pharmacy and they will forward the refill request to us. Please allow 3 business days for your refill to be completed.          Additional Information About Your Visit        [employer] Information     [employer] gives you secure access to your electronic health record. If you see a primary care provider, you can also send messages to your care team and make appointments. If you have questions, please call your primary care clinic.  If you do not have a primary care provider, please call [phone] and they will assist you.        Care EveryWhere ID     This is your Care EveryWhere ID. This could be used by other organizations to access your [facility] medical records  [id number]        Your Vitals Were     Pulse Temperature Respirations Pulse Oximetry BMI (Body Mass Index)       70 96.9  F (36.1  C) (Oral) 18 98% 34.35 kg/m2        Blood Pressure from Last 3 Encounters:   04/19/18 144/88   02/07/18 (!) 149/106   02/03/18 (!) 135/98    Weight from Last 3 Encounters:   04/19/18 242 lb 12.8 oz (110.1 kg)   02/03/18 254 lb 13.6 oz (115.6 kg)   01/18/18 253 lb (114.8 kg)               Primary Care Provider Office Phone # Fax #    Guzman Walter [phone] [address] [place] [address]        Equal Access to Services     PERCY ROMERO AH: Hadii aad ku  slava Black, lewda lumarkadaha, qajoannata kava melvin, ro nallelyin hayaan jovanysteve siobhansubhash laLeonidaspaul pa. So [facility] [phone].    ATENCIÓN: Si habla alpeshañol, tiene a slade disposición servicios gratuitos de asistencia lingüística. Grace al [phone].    We comply with applicable federal civil rights laws and [place] laws. We do not discriminate on the basis of race, color, national origin, age, disability, sex, sexual orientation, or gender identity.            Thank you!     Thank you for choosing [facility]  for your care. Our goal is always to provide you with excellent care. Hearing back from our patients is one way we can continue to improve our services. Please take a few minutes to complete the written survey that you may receive in the mail after your visit with us. Thank you!             Your Updated Medication List - Protect others around you: Learn how to safely use, store and throw away your medicines at www.disposemymeds.org.          This list is accurate as of 4/19/18 11:51 AM.  Always use your most recent med list.                   Brand Name Dispense Instructions for use Diagnosis    ALPRAZolam 0.5 MG tablet    XANAX    20 tablet    Take 1 tablet (0.5 mg) by mouth 3 times daily as needed for anxiety (As needed 15mins prior to take-off)    EILEEN (generalized anxiety disorder), Fear of flying       amLODIPine-benazepril 5-20 MG per capsule    LOTREL    30 capsule    Take 1 capsule by mouth daily    Benign essential hypertension       MELATONIN PO

## 2018-04-19 NOTE — PROGRESS NOTES
SUBJECTIVE:   Wilfrid Vickers is a 42 year old male who presents to clinic today for the following health issues:      Chest Pain      Onset: 6 months    Description (location/character/radiation/duration): right side chest    Intensity:  mild, moderate    Accompanying signs and symptoms:        Shortness of breath: no        Sweating: no        Nausea/vomitting: no        Palpitations: YES       Other (fevers/chills/cough/heartburn/lightheadedness): no     History (similar episodes/previous evaluation): Has happened before off and on    Precipitating or alleviating factors:       Worse with exertion: no        Worse with breathing: no        Related to eating: no        Better with burping: no     Therapies tried and outcome: Not smoking marijuana ; gets better      Patient presents with pain in right side of chest.  Described as sharp, stabbing, not positional, can last for a few hours, has had it for about 6 months or so, he notices it most when smoking, which he does rarely.  He has been a little more active lately as he's trying to lose weight in order to drop blood pressure, he has also been doing a lot of shoveling snow in the last few months.  He had URI over the weekend with cough that made it worse.  He is concerned about lung cancer given that his aunt had it and because he has smoked in the past.    Problem list and histories reviewed & adjusted, as indicated.  Additional history: as documented    BP Readings from Last 3 Encounters:   04/19/18 144/88   02/07/18 (!) 149/106   02/03/18 (!) 135/98    Wt Readings from Last 3 Encounters:   04/19/18 242 lb 12.8 oz (110.1 kg)   02/03/18 254 lb 13.6 oz (115.6 kg)   01/18/18 253 lb (114.8 kg)        Reviewed and updated as needed this visit by clinical staff  Tobacco  Allergies  Meds  Problems       Reviewed and updated as needed this visit by Provider  Allergies  Meds  Problems         ROS:  Constitutional, HEENT, cardiovascular, pulmonary, gi and  gu systems are negative, except as otherwise noted.    OBJECTIVE:     /88  Pulse 70  Temp 96.9  F (36.1  C) (Oral)  Resp 18  Wt 242 lb 12.8 oz (110.1 kg)  SpO2 98%  BMI 34.35 kg/m2  Body mass index is 34.35 kg/(m^2).  GENERAL: healthy, alert and no distress  EYES: Eyes grossly normal to inspection, PERRL and conjunctivae and sclerae normal  NECK: no adenopathy, no asymmetry, masses, or scars and thyroid normal to palpation  RESP: lungs clear to auscultation - no rales, rhonchi or wheezes  CV: regular rate and rhythm, normal S1 S2, no S3 or S4, no murmur, click or rub, no peripheral edema and peripheral pulses strong  MS: no gross musculoskeletal defects noted, no edema  NEURO: Normal strength and tone, mentation intact and speech normal  PSYCH: mentation appears normal, affect normal/bright    ASSESSMENT/PLAN:     1. Atypical chest pain  Likely musculoskeletal in nature given onset with increased activity and the fact that pain is somewhat positional.  Recommend rest, NSAIDS.  Patient had been cleared in the recent past by cardiology.  - XR Chest 2 Views; Future    Follow up if symptoms worsen or fail to improve.     Guzman Hammonds MD  [facility]

## 2018-04-19 NOTE — PATIENT INSTRUCTIONS
Chest Wall Pain: Costochondritis    The chest pain that you have had today is caused by costochondritis. This condition is caused by an inflammation of the cartilage joining your ribs to your breastbone. It is not caused by heart or lung problems. Your healthcare team has made sure that the chest pain you feel is not from a life threatening cause of chest pain such as heart attack, collapsed lung, blood clot in the lung, tear in the aorta, or esophageal rupture. The inflammation may have been brought on by a blow to the chest, lifting heavy objects, intense exercise, or an illness that made you cough and sneeze a lot. It often occurs during times of emotional stress. It can be painful, but it is not dangerous. It usually goes away in 1 to 2 weeks. But it may happen again. Rarely, a more serious condition may cause symptoms similar to costochondritis. That s why it s important to watch for the warning signs listed below.  Home care  Follow these guidelines when caring for yourself at home:    If you feel that emotional stress is a cause of your condition, try to figure out the sources of that stress. It may not be obvious. Learn ways to deal with the stress in your life. This can include regular exercise, muscle relaxation, meditation, or simply taking time out for yourself.    You may use acetaminophen, ibuprofen, or naproxen to control pain, unless another pain medicine was prescribed. If you have liver or kidney disease or ever had a stomach ulcer, talk with your healthcare provider before using these medicines.    You can also help ease pain by using a hot, wet compress or heating pad. Use this with or without a medicated skin cream that helps relieves pain.    Do stretching exercise as advised by your provider.    Take any prescribed medicines as directed.  Follow-up care  Follow up with your healthcare provider, or as advised, if you do not start to get better in the next 2 days.  When to seek medical  advice  Call your healthcare provider right away if any of these occur:    A change in the type of pain. Call if it feels different, becomes more serious, lasts longer, or spreads into your shoulder, arm, neck, jaw, or back.    Shortness of breath or pain gets worse when you breathe    Weakness, dizziness, or fainting    Cough with dark-colored sputum (phlegm) or blood    Abdominal pain    Dark red or black stools    Fever of 100.4 F (38 C) or higher, or as directed by your healthcare provider  Date Last Reviewed: 12/1/2016 2000-2017 [employer]. [address] [place] [address]. All rights reserved. This information is not intended as a substitute for professional medical care. Always follow your healthcare professional's instructions.      [facility]    If you have any questions regarding to your visit please contact your care team:       Team Purple:   Clinic Hours Telephone Number   Dr. Hailee Hammonds   7am-7pm  Monday - Thursday   7am-5pm  Fridays  [phone]  (Appointment scheduling available 24/7)    Questions about your Visit?   Team Line:  [phone]   [facility] [place] [facility] - 11am-9pm Monday-Friday Saturday-Sunday- 9am-5pm   [place] - 5pm-9pm Monday-Friday Saturday-Sunday- 9am-5pm  [phone] - [place]   [phone] - [place]       What options do I have for visits at the clinic other than the traditional office visit?  To expand how we care for you, many of our providers are utilizing electronic visits (e-visits) and telephone visits, when medically appropriate, for interactions with their patients rather than a visit in the clinic.   We also offer nurse visits for many medical concerns. Just like any other service, we will bill your insurance company for this type of visit based on time spent on the phone with your provider. Not all insurance companies cover  these visits. Please check with your medical insurance if this type of visit is covered. You will be responsible for any charges that are not paid by your insurance.      E-visits via [employer]:  generally incur a $35.00 fee.  Telephone visits:  Time spent on the phone: *charged based on time that is spent on the phone in increments of 10 minutes. Estimated cost:   5-10 mins $30.00   11-20 mins. $59.00   21-30 mins. $85.00     Use [employer] (secure email communication and access to your chart) to send your primary care provider a message or make an appointment. Ask someone on your Team how to sign up for [employer].  For a Price Quote for your services, please call our Consumer Price Line at [phone].  As always, Thank you for trusting us with your health care needs!    Shantell Dallas MA

## 2018-04-27 ENCOUNTER — TELEPHONE (OUTPATIENT)
Dept: FAMILY MEDICINE | Facility: CLINIC | Age: 43
End: 2018-04-27

## 2018-04-27 ENCOUNTER — OFFICE VISIT (OUTPATIENT)
Dept: FAMILY MEDICINE | Facility: CLINIC | Age: 43
End: 2018-04-27
Payer: COMMERCIAL

## 2018-04-27 ENCOUNTER — RADIANT APPOINTMENT (OUTPATIENT)
Dept: GENERAL RADIOLOGY | Facility: CLINIC | Age: 43
End: 2018-04-27
Attending: NURSE PRACTITIONER
Payer: COMMERCIAL

## 2018-04-27 VITALS
DIASTOLIC BLOOD PRESSURE: 86 MMHG | HEART RATE: 83 BPM | OXYGEN SATURATION: 96 % | TEMPERATURE: 99 F | SYSTOLIC BLOOD PRESSURE: 128 MMHG

## 2018-04-27 DIAGNOSIS — J18.9 PNEUMONIA DUE TO INFECTIOUS ORGANISM, UNSPECIFIED LATERALITY, UNSPECIFIED PART OF LUNG: Primary | ICD-10-CM

## 2018-04-27 DIAGNOSIS — J18.9 PNEUMONIA DUE TO INFECTIOUS ORGANISM, UNSPECIFIED LATERALITY, UNSPECIFIED PART OF LUNG: ICD-10-CM

## 2018-04-27 PROCEDURE — 71046 X-RAY EXAM CHEST 2 VIEWS: CPT

## 2018-04-27 PROCEDURE — 99214 OFFICE O/P EST MOD 30 MIN: CPT | Performed by: NURSE PRACTITIONER

## 2018-04-27 RX ORDER — AZITHROMYCIN 250 MG/1
TABLET, FILM COATED ORAL
Qty: 6 TABLET | Refills: 0 | Status: SHIPPED | OUTPATIENT
Start: 2018-04-27 | End: 2019-04-25

## 2018-04-27 ASSESSMENT — PAIN SCALES - GENERAL: PAINLEVEL: NO PAIN (0)

## 2018-04-27 NOTE — TELEPHONE ENCOUNTER
Pharmacy notified, and patient explained he tolerates this medication.  Pharmacy dispensed medication.   Aurelia Magallanes RN

## 2018-04-27 NOTE — PATIENT INSTRUCTIONS
[facility]    If you have any questions regarding to your visit please contact your care team:       Team Red:   Clinic Hours Telephone Number   Dr. Steffi Samson, NP   7am-7pm  Monday - Thursday   7am-5pm  Fridays  [phone]  (Appointment scheduling available 24/7)    Questions about your recent visit?   Team Line  [phone]   Urgent Care - [place] and [facility] - 11am-9pm Monday-Friday Saturday-Sunday- 9am-5pm   [place] - 5pm-9pm Monday-Friday Saturday-Sunday- 9am-5pm  [phone] - [place]  [phone] - [place]       What options do I have for a visit other than an office visit? We offer electronic visits (e-visits) and telephone visits, when medically appropriate.  Please check with your medical insurance to see if these types of visits are covered, as you will be responsible for any charges that are not paid by your insurance.      You can use [employer] (secure electronic communication) to access to your chart, send your primary care provider a message, or make an appointment. Ask a team member how to get started.     For a price quote for your services, please call our Consumer Price Line at [phone] or our Imaging Cost estimation line at [phone] (for imaging tests).      Discharged by Julieta Lambert MA.

## 2018-04-27 NOTE — TELEPHONE ENCOUNTER
Reason for Call:  Other call back    Detailed comments: Patient was prescribed azithromycin (ZITHROMAX) 250 MG tablet and patient is allergic to that class of medication. Please advise     Phone Number Patient can be reached at: Other phone number: [phone]     Best Time: ASAP    Can we leave a detailed message on this number? YES    Call taken on 4/27/2018 at 2:23 PM by Manda Fuentes

## 2018-04-27 NOTE — MR AVS SNAPSHOT
After Visit Summary   4/27/2018    Wilfrid Vickers    MRN: [id number]           Patient Information     Date Of Birth          1975        Visit Information        Provider Department      4/27/2018 1:40 PM Enriqueta Samson APRN CNP [facility]        Today's Diagnoses     Pneumonia due to infectious organism, unspecified laterality, unspecified part of lung    -  1      Care Instructions    [facility]    If you have any questions regarding to your visit please contact your care team:       Team Red:   Clinic Hours Telephone Number   Dr. Steffi Samson, NP   7am-7pm  Monday - Thursday   7am-5pm  Fridays  [phone]  (Appointment scheduling available 24/7)    Questions about your recent visit?   Team Line  [phone]   Urgent Care - [place] and [facility] - 11am-9pm Monday-Friday Saturday-Sunday- 9am-5pm   [employer] - 5pm-9pm Monday-Friday Saturday-Sunday- 9am-5pm  [phone] - [place]  [phone] - [employer]       What options do I have for a visit other than an office visit? We offer electronic visits (e-visits) and telephone visits, when medically appropriate.  Please check with your medical insurance to see if these types of visits are covered, as you will be responsible for any charges that are not paid by your insurance.      You can use Reunify (secure electronic communication) to access to your chart, send your primary care provider a message, or make an appointment. Ask a team member how to get started.     For a price quote for your services, please call our Consumer Price Line at [phone] or our Imaging Cost estimation line at [phone] (for imaging tests).      Discharged by Julieta Lambert MA.            Follow-ups after your visit        Your next 10 appointments already scheduled     Aug 06, 2018  9:00 AM CDT   LAB with [facility]   [facility]  ([facility])    [address] [id number] [phone]           Please do not eat 10-12 hours before your appointment if you are coming in fasting for labs on lipids, cholesterol, or glucose (sugar). This does not apply to pregnant women. Water, hot tea and black coffee (with nothing added) are okay. Do not drink other fluids, diet soda or chew gum.            Aug 08, 2018 11:30 AM CDT   Return Visit with Manny Subramanian MD   [facility] AT [facility] ([facility])    [address] [place] [address] 2nd Floor  [facility] [id number] [phone]              Future tests that were ordered for you today     Open Future Orders        Priority Expected Expires Ordered    XR Chest 2 Views Routine 4/27/2018 4/27/2019 4/27/2018            Who to contact     If you have questions or need follow up information about today's clinic visit or your schedule please contact [facility] directly at [phone].  Normal or non-critical lab and imaging results will be communicated to you by [email or web address], letter or phone within 4 business days after the clinic has received the results. If you do not hear from us within 7 days, please contact the clinic through [email or web address] or phone. If you have a critical or abnormal lab result, we will notify you by phone as soon as possible.  Submit refill requests through SIMTEK or call your pharmacy and they will forward the refill request to us. Please allow 3 business days for your refill to be completed.          Additional Information About Your Visit        SIMTEK Information     SIMTEK gives you secure access to your electronic health record. If you see a primary care provider, you can also send messages to your care team and make appointments. If you have questions, please call your primary care clinic.  If you do not have a primary care provider, please call [phone] and they will assist  you.        Care EveryWhere ID     This is your Care EveryWhere ID. This could be used by other organizations to access your [facility] medical records  [id number]        Your Vitals Were     Pulse Temperature Pulse Oximetry             83 99  F (37.2  C) (Oral) 96%          Blood Pressure from Last 3 Encounters:   04/27/18 128/86   04/19/18 144/88   02/07/18 (!) 149/106    Weight from Last 3 Encounters:   04/19/18 242 lb 12.8 oz (110.1 kg)   02/03/18 254 lb 13.6 oz (115.6 kg)   01/18/18 253 lb (114.8 kg)                 Today's Medication Changes          These changes are accurate as of 4/27/18  2:09 PM.  If you have any questions, ask your nurse or doctor.               Start taking these medicines.        Dose/Directions    azithromycin 250 MG tablet   Commonly known as:  ZITHROMAX   Used for:  Pneumonia due to infectious organism, unspecified laterality, unspecified part of lung   Started by:  Enriqueta Samson APRN CNP        Two tablets first day, then one tablet daily for four days.   Quantity:  6 tablet   Refills:  0            Where to get your medicines      These medications were sent to [email or web address] [address] [facility] [address] [place] [address] AT [facility] OF [address]  [address] [facility] [address]     Phone:  [phone]     azithromycin 250 MG tablet                Primary Care Provider Office Phone # Fax #    Guzman Walter [phone] [address] [facility] [address]        Equal Access to Services     PERCY ROMERO AH: Hadii aad ku hadasho Soomaali, waaxda luqadaha, qaybta kaalmada adesteveyafrancisco, waxmoriah idiamber winn. So [facility] [phone].    ATENCIÓN: Si habla español, tiene a slade disposición servicios gratuitos de asistencia lingüística. Llame al [phone].    We comply with applicable federal civil rights laws and [place] laws. We do not discriminate on the basis of race, color, national origin,  age, disability, sex, sexual orientation, or gender identity.            Thank you!     Thank you for choosing [facility]  for your care. Our goal is always to provide you with excellent care. Hearing back from our patients is one way we can continue to improve our services. Please take a few minutes to complete the written survey that you may receive in the mail after your visit with us. Thank you!             Your Updated Medication List - Protect others around you: Learn how to safely use, store and throw away your medicines at www.disposemymeds.org.          This list is accurate as of 4/27/18  2:09 PM.  Always use your most recent med list.                   Brand Name Dispense Instructions for use Diagnosis    ALPRAZolam 0.5 MG tablet    XANAX    20 tablet    Take 1 tablet (0.5 mg) by mouth 3 times daily as needed for anxiety (As needed 15mins prior to take-off)    EILEEN (generalized anxiety disorder), Fear of flying       amLODIPine-benazepril 5-20 MG per capsule    LOTREL    30 capsule    Take 1 capsule by mouth daily    Benign essential hypertension       azithromycin 250 MG tablet    ZITHROMAX    6 tablet    Two tablets first day, then one tablet daily for four days.    Pneumonia due to infectious organism, unspecified laterality, unspecified part of lung       MELATONIN PO

## 2018-04-27 NOTE — TELEPHONE ENCOUNTER
Patient experienced vomiting with Erythromycin in the past but has taken Azithromycin many times and tolerated well. Ok to dispense Azithromycin.  Enriqueta Samson, CNP

## 2018-04-27 NOTE — PROGRESS NOTES
SUBJECTIVE:   Wilfrid Vickers is a 42 year old male who presents to clinic today for the following health issues:      RESPIRATORY SYMPTOMS      Duration: 2 weeks, states the actual Pneumonia started on Tuesday    Description  nasal congestion, rhinorrhea, cough, fever and fatigue/malaise    Severity: mild    Accompanying signs and symptoms: as above    History (predisposing factors):  States he has a history of pneumonia    Precipitating or alleviating factors: None    Therapies tried and outcome:  States he is taking a left over antibiotic (Keflex 500mg) which is helping but he doesn't think he has enough to get rid of his infection    Slight cough started 2 weeks ago. Was out drinking/not sleeping over the weekend. Woke up Tuesday with dry, barking cough. Had an old prescription for Keflex which he took for 3 days. Reports 4 episodes of pneumonia in childhood and 1 episode 1 year ago. Feels similar to prior bouts of pneumonia and suspect he would be much sicker had he not started the antibiotics. No fever.       Problem list and histories reviewed & adjusted, as indicated.  Additional history: as documented    Patient Active Problem List   Diagnosis     CARDIOVASCULAR SCREENING; LDL GOAL LESS THAN 160     Labral tear of shoulder     Recurrent shoulder dislocation     Phobia, flying     Elevated blood pressure reading without diagnosis of hypertension     Benign essential hypertension     PVC's (premature ventricular contractions)     Anxiety disorder, unspecified type     Past Surgical History:   Procedure Laterality Date     ARTHROPLASTY SHOULDER Left     torn labrum, rotator cuff repair       Social History   Substance Use Topics     Smoking status: Never Smoker     Smokeless tobacco: Never Used     Alcohol use Yes      Comment: 6 pack per weekend twice monthly     Family History   Problem Relation Age of Onset     CANCER Father      Prostate     Allergies Mother            Reviewed and updated as needed  this visit by clinical staff       Reviewed and updated as needed this visit by Provider         ROS:  Constitutional, HEENT, cardiovascular, pulmonary, gi and gu systems are negative, except as otherwise noted.    OBJECTIVE:     /86 (BP Location: Right arm, Patient Position: Chair, Cuff Size: Adult Large)  Pulse 83  Temp 99  F (37.2  C) (Oral)  SpO2 96%  There is no height or weight on file to calculate BMI.  GENERAL: healthy, alert and no distress  EYES: Eyes grossly normal to inspection, PERRL and conjunctivae and sclerae normal  HENT: ear canals and TM's normal, nose and mouth without ulcers or lesions  NECK: no adenopathy, no asymmetry, masses, or scars and thyroid normal to palpation  RESP: course rhonchi throughout  CV: regular rate and rhythm, normal S1 S2, no S3 or S4, no murmur, click or rub, no peripheral edema and peripheral pulses strong  ABDOMEN: soft, nontender, no hepatosplenomegaly, no masses and bowel sounds normal    Diagnostic Test Results:  Xray - negative    ASSESSMENT/PLAN:       1. Pneumonia due to infectious organism, unspecified laterality, unspecified part of lung  Clinically consistent with pneumonia.  Will document with CXR- if has 3+ cases of x-ray confirmed pneumonia, consider immunology referral.  - azithromycin (ZITHROMAX) 250 MG tablet; Two tablets first day, then one tablet daily for four days.  Dispense: 6 tablet; Refill: 0  - XR Chest 2 Views; Future    Follow up as needed    CHAY Gurrola [facility]

## 2018-05-01 ENCOUNTER — OFFICE VISIT (OUTPATIENT)
Dept: FAMILY MEDICINE | Facility: CLINIC | Age: 43
End: 2018-05-01
Payer: COMMERCIAL

## 2018-05-01 VITALS
HEIGHT: 70 IN | DIASTOLIC BLOOD PRESSURE: 80 MMHG | OXYGEN SATURATION: 96 % | BODY MASS INDEX: 33.64 KG/M2 | TEMPERATURE: 98.8 F | HEART RATE: 67 BPM | RESPIRATION RATE: 16 BRPM | WEIGHT: 235 LBS | SYSTOLIC BLOOD PRESSURE: 120 MMHG

## 2018-05-01 DIAGNOSIS — J18.9 COMMUNITY ACQUIRED PNEUMONIA, UNSPECIFIED LATERALITY: Primary | ICD-10-CM

## 2018-05-01 PROCEDURE — 99213 OFFICE O/P EST LOW 20 MIN: CPT | Performed by: FAMILY MEDICINE

## 2018-05-01 RX ORDER — AZITHROMYCIN 250 MG/1
TABLET, FILM COATED ORAL
Qty: 4 TABLET | Refills: 0 | Status: SHIPPED | OUTPATIENT
Start: 2018-05-01 | End: 2019-04-25

## 2018-05-01 ASSESSMENT — PAIN SCALES - GENERAL: PAINLEVEL: NO PAIN (0)

## 2018-05-01 NOTE — PATIENT INSTRUCTIONS
[facility]    If you have any questions regarding to your visit please contact your care team:       Team Purple:   Clinic Hours Telephone Number   Dr. Hailee Hammonds   7am-7pm  Monday - Thursday   7am-5pm  Fridays  [phone]  (Appointment scheduling available 24/7)    Questions about your recent visit?   Team Line:  [phone]   Urgent Care - [place] and [facility] - 11am-9pm Monday-Friday Saturday-Sunday- 9am-5pm   [place] - 5pm-9pm Monday-Friday Saturday-Sunday- 9am-5pm  [phone] - [place]  [phone] - [place]       What options do I have for a visit other than an office visit? We offer electronic visits (e-visits) and telephone visits, when medically appropriate.  Please check with your medical insurance to see if these types of visits are covered, as you will be responsible for any charges that are not paid by your insurance.      You can use [employer] (secure electronic communication) to access to your chart, send your primary care provider a message, or make an appointment. Ask a team member how to get started.     For a price quote for your services, please call our Consumer Price Line at [phone] or our Imaging Cost estimation line at [phone] (for imaging tests).    Mary Anne Alejandro MA

## 2018-05-01 NOTE — PROGRESS NOTES
"  SUBJECTIVE:   Wilfrid Vickers is a 42 year old male who presents to clinic today for the following health issues:      Chief Complaint   Patient presents with     Follow Up     Pneumonia      Health Maintenance     Pneumovax, Microalbumin, Foot Exam, Eye Exam      Destin is a 43yo M with distant history of recurrent pna as a child presents for pna follow-up visit.  He was treated with azithromycin x 5 days and feels a lot better, has been afebrile.  However because his cough persists, and given that he has taken 10 days of azithromycin in the past, he's requesting an abx extension.    Problem list and histories reviewed & adjusted, as indicated.  Additional history: as documented    BP Readings from Last 3 Encounters:   05/01/18 120/80   04/27/18 128/86   04/19/18 144/88    Wt Readings from Last 3 Encounters:   05/01/18 235 lb (106.6 kg)   04/19/18 242 lb 12.8 oz (110.1 kg)   02/03/18 254 lb 13.6 oz (115.6 kg)            Reviewed and updated as needed this visit by clinical staff  Tobacco  Allergies  Meds  Problems  Med Hx  Surg Hx  Fam Hx  Soc Hx        Reviewed and updated as needed this visit by Provider  Allergies  Meds  Problems         ROS:  Constitutional, HEENT, cardiovascular, pulmonary, gi and gu systems are negative, except as otherwise noted.    OBJECTIVE:     /80  Pulse 67  Temp 98.8  F (37.1  C) (Oral)  Resp 16  Ht 5' 9.69\" (1.77 m)  Wt 235 lb (106.6 kg)  SpO2 96%  BMI 34.02 kg/m2  Body mass index is 34.02 kg/(m^2).  GENERAL: healthy, alert and no distress  EYES: Eyes grossly normal to inspection, PERRL and conjunctivae and sclerae normal  HENT: ear canals and TM's normal, nose and mouth without ulcers or lesions  NECK: no adenopathy, no asymmetry, masses, or scars and thyroid normal to palpation  RESP: lungs clear to auscultation - no rales, rhonchi or wheezes  CV: regular rate and rhythm, normal S1 S2, no S3 or S4, no murmur, click or rub, no peripheral edema and peripheral " pulses strong  PSYCH: mentation appears normal, affect normal/bright    ASSESSMENT/PLAN:     1. Community acquired pneumonia, unspecified laterality  Had discussion regarding lack of necessity to continue abx, however requested still.  Plan to give an additional 4 tablets of azithromycin.  - azithromycin (ZITHROMAX) 250 MG tablet; Take 1 tablet daily to extend treatment.  Dispense: 4 tablet; Refill: 0    Follow up if symptoms worsen or fail to improve.     Guzman Hammonds MD  [facility]

## 2018-07-17 DIAGNOSIS — I10 BENIGN ESSENTIAL HYPERTENSION: ICD-10-CM

## 2018-07-17 NOTE — TELEPHONE ENCOUNTER
Received fax on 07/17/18 from [facility] in [place] requesting refill(s) for the following medication(s):    1.   Rx Number:  [id number]   Drug:  Amlodipine-Benaz  5/20 MG        Sig: Take one capsule by mouth daily   Prescribed Qty:  30    Last Refill:  06/13/2018      Requested P/U Time:  07/18/2018      Patient's Last Cardiology Appointment:  02/07/18   Patient's Next Cardiology Appointment:  08/08/18, and lab.08/06/18                Refill encounter routed to [facility].  Lay Chávez L.P.N.

## 2018-07-18 RX ORDER — AMLODIPINE AND BENAZEPRIL HYDROCHLORIDE 5; 20 MG/1; MG/1
1 CAPSULE ORAL DAILY
Qty: 30 CAPSULE | Refills: 1 | Status: SHIPPED | OUTPATIENT
Start: 2018-07-18 | End: 2018-09-13

## 2018-07-18 NOTE — TELEPHONE ENCOUNTER
Signed Prescriptions:                        Disp   Refills    amLODIPine-benazepril (LOTREL) 5-20 MG per*30 cap*1        Sig: Take 1 capsule by mouth daily Office visit needed for           future refills  Authorizing Provider: AUBREE CARTER  Ordering User: YEISON DRAPER

## 2018-07-24 ENCOUNTER — DOCUMENTATION ONLY (OUTPATIENT)
Dept: LAB | Facility: CLINIC | Age: 43
End: 2018-07-24

## 2018-07-24 NOTE — PROGRESS NOTES
Patient has an upcoming previsit appointment on 08/06/2018. Please review pended orders and add additional orders if needed.     Thank you,   Cassie French

## 2018-08-03 ENCOUNTER — DOCUMENTATION ONLY (OUTPATIENT)
Dept: LAB | Facility: CLINIC | Age: 43
End: 2018-08-03

## 2018-09-11 ENCOUNTER — OFFICE VISIT (OUTPATIENT)
Dept: CARDIOLOGY | Facility: CLINIC | Age: 43
End: 2018-09-11
Payer: COMMERCIAL

## 2018-09-11 VITALS
WEIGHT: 243 LBS | DIASTOLIC BLOOD PRESSURE: 97 MMHG | SYSTOLIC BLOOD PRESSURE: 139 MMHG | HEART RATE: 70 BPM | BODY MASS INDEX: 35.18 KG/M2 | OXYGEN SATURATION: 97 %

## 2018-09-11 DIAGNOSIS — E78.5 HYPERLIPIDEMIA LDL GOAL <70: ICD-10-CM

## 2018-09-11 DIAGNOSIS — I49.3 PVC'S (PREMATURE VENTRICULAR CONTRACTIONS): Primary | ICD-10-CM

## 2018-09-11 DIAGNOSIS — I10 BENIGN ESSENTIAL HYPERTENSION: ICD-10-CM

## 2018-09-11 DIAGNOSIS — E11.9 TYPE 2 DIABETES MELLITUS WITHOUT COMPLICATION, WITHOUT LONG-TERM CURRENT USE OF INSULIN (H): ICD-10-CM

## 2018-09-11 PROCEDURE — 99214 OFFICE O/P EST MOD 30 MIN: CPT | Performed by: INTERNAL MEDICINE

## 2018-09-11 RX ORDER — LORATADINE 10 MG/1
TABLET ORAL DAILY
COMMUNITY
End: 2018-10-31

## 2018-09-11 NOTE — LETTER
9/11/2018    RE: Wilfrid Vickers  [address] [facility] [address]       Dear Colleague,    Thank you for the opportunity to participate in the care of your patient, Wilfrid Vickers, at the [facility] HEART AT [facility]. Please see a copy of my visit note below.    Referring provider: Guzman Walter MD  Chief complaint: palpitations and high blood pressure    HPI: Mr. Wilfrid Vickers is a 42 year old  male with PMH significant for PVCs and hypertension.     has been lately feeling foggy in his head and lightheaded and have been measuring his BP higher than normal. He thinks his BP has been slowly creeping up over the last 3 years and during the last 6 months it has been higher than before. He has been to the ED on 2/3/2018 because he was feeling headache and his BP was 200/125 mmHg. He was not on any medication at that point. When he was in the ED his BP was 174/118 mmHg. Labetolol 10 mg IV was given and his BP came down to 135/98 mmHg and he was sent home with 25 mg of metoprolol. His head CT was normal. He started taking that since then however his BP yesterday was 161/110 mmHg in the morning and today he measured it 151/105 mmHg.     He has been feeling palpitations during the last 1.5 years and has been diagnosed with PVCs when he was [place]. He was on metoprolol for some time but did not benefit from that and stopped one month ago. He feels palpitations as skipped beats mostly aggrevated by caffeine and beer and he stopped drinking those. He exercises regularly and working to lose some weight. He feels very well during exercise, however after he stops exercising he starts feeling more skipped beats. He states he suddenly feels the onset of skipped beats. The frequency of the episodes varies, last week he felt skipped beats on 2 days of the week.     He denies exertional CP, FELIX,  LE edema, palpitations, PND, ortopnea or syncope. His HbA1c is 6.9 on 2/3/2018, but the patient refuses to get further treatment for that. He states the test was done without his consent. He states if he loses weight he will decrease his HbA1c. His LDL is 216 mg/dl and he again wants to exercise and lose weight and doesnot want to be on statin for now.He doesnot have any other prior cardiac history. He is a non-smoker. No FH of CAD or SCD    His echocardiogram from 2016 shows a structurally normal heart. I have also reviewed his ECG from 2/3/2018 which shows normal sinus rhythm, no premature beats.    Medications, personal, family, and social history reviewed with patient and revised.    Interval follow-up 9/11/2018:  The patient is here for six-month follow-up.  He still feels premature beats (likely PVCs based on a previous ECG ) almost every day sometimes lasting a few hours.  He reports having more PVCs particularly after eating, drinking alcohol or coffee, smoking marijuana. He denies worsening palpitations with activity, associated lightheaded or dizzy feeling.  He feels anxious with these skipped beats.  The patient reports feeling mildly dizzy almost every day sitting or standing.  He correlates more dizziness with high carbohydrate meals.  No syncope.  He has atypical chest pain unrelated to exertion only at one spot on the right side of his chest.  He denies dyspnea on exertion.  He walks every day 45 minutes with his dog.    PAST MEDICAL HISTORY:  Hypertension  PVCs  Diabetes  HL    CURRENT MEDICATIONS:  Amlodipine benazepril 5/20 mg  Melatonin  Xanax as needed.    PAST SURGICAL HISTORY:  Past Surgical History:   Procedure Laterality Date     ARTHROPLASTY SHOULDER Left     torn labrum, rotator cuff repair       ALLERGIES:     Allergies   Allergen Reactions     Amoxicillin      Rash     Erythromycin      Vomiting       FAMILY HISTORY:  Family History   Problem Relation Age of Onset     Cancer Father       Prostate     Allergies Mother          SOCIAL HISTORY:  Social History   Substance Use Topics     Smoking status: Never Smoker     Smokeless tobacco: Never Used     Alcohol use Yes      Comment: 6 pack per weekend twice monthly       ROS:   Constitutional: No fever, chills, or sweats. Weight stable.   ENT: No visual disturbance, ear ache, epistaxis, sore throat.   Cardiovascular: As per HPI.   Respiratory:Dry cough+, hemoptysis.    GI: No nausea, vomiting, hematemesis, melena, or hematochezia.   : No hematuria.   Integument: Negative.   Hematologic: No easy bruising, no easy bleeding.  Neuro: Negative.   Endocrinology: No significant heat or cold intolerance   Musculoskeletal: No myalgia.    Exam:  BP (!) 139/97 (BP Location: Left arm, Patient Position: Sitting, Cuff Size: Adult Large)  Pulse 70  Wt 110.2 kg (243 lb)  SpO2 97%  BMI 35.18 kg/m2  GENERAL APPEARANCE: alert and no distress  HEENT: no icterus, no central cyanosis  LYMPH/NECK: JVP not elevated  RESPIRATORY: lungs clear to auscultation - no rales, rhonchi or wheezes, no use of accessory muscles, no retractions, respirations are unlabored, normal respiratory rate  CARDIOVASCULAR: regular rhythm, normal S1, S2, no S3 or S4 and no murmur, click or rub, precordium quiet with normal PMI.  GI: soft, non tender  EXTREMITIES: no edema  NEURO: alert and oriented to person/place/time, normal speech,and affect  SKIN: no ecchymoses, no rashes     I have reviewed the labs and personally reviewed the imaging below (EKG) and made my comment in the assessment and plan.    Labs:  CBC RESULTS:   Lab Results   Component Value Date    WBC 9.4 02/03/2018    RBC 5.03 02/03/2018    HGB 16.0 02/03/2018    HCT 47.7 02/03/2018    MCV 95 02/03/2018    MCH 31.8 02/03/2018    MCHC 33.5 02/03/2018    RDW 12.3 02/03/2018     02/03/2018       BMP RESULTS:  Lab Results   Component Value Date     03/28/2018    POTASSIUM 4.0 03/28/2018    CHLORIDE 105 03/28/2018    CO2 24  03/28/2018    ANIONGAP 11 03/28/2018     (H) 03/28/2018    BUN 16 03/28/2018    CR 1.05 03/28/2018    GFRESTIMATED 78 03/28/2018    GFRESTBLACK >90 03/28/2018    HEMANT 9.7 03/28/2018        Transthoracic Echocardiogram 9/16/2016  Left ventricular size is normal.  The Ejection Fraction is estimated at 50-55%.  Right ventricular function, chamber size, wall motion, and thickness are  normal.  Pulmonary artery systolic pressure cannot be assessed.  No pericardial effusion is present.  No significant valvular dysfunction noted.    EKG 2/3/2018 normal    Assessment and Plan:   Mr. Wilfrid Vickers is a 42 year old  male with PMH significant for PVCs and hypertension. His physical exam is normal.     1. Systemic hypertension: Well controlled now after uptitrating benazepril-lisinopril to 5-20 mg.    2. PVCs: He feels them everyday. No syncope. No underlying structural heart disease. His CBC, electrolyes (Na, K) and TSH are all normal. Normal kidney function. Recommended Zio-Patch for 2 weeks.    3. HbA1c: Measured 6.9 on 2/3/2019. He doesnot want any further investigation or treatment for DM.    4. Hyperlipidemia: His LDL is 216 mg/dl FROM 11/2017. Today he has changed his mind and with a repeat lipid check if LDL is high he wants to be on statin. I also recommended CAC since he has intermediate risk according to Walker risk score (10.1% risk ).    Plan:  -Continue benazepril-lisinopril 5-20 mg  -Recheck lipid profile  -CAC score if >100 he will need ASA 81 mg.  -Zio-patch for 2 weeks for PVC burden.    Will review results and f/u accordingly.    It was my absolute pleasure to meet  in the office today. Please donot hesitate to contact me if you have any questions or concerns.    Manny CARTER MD  [facility] Division of Cardiology  Pager [phone]

## 2018-09-11 NOTE — NURSING NOTE
"Chief Complaint   Patient presents with     Hypertension      6 mo follow up/ FPIB-Per patient chest discomfort, heart palpitations,lightheaded,and fatigue occationally       Initial BP (!) 160/94 (BP Location: Left arm, Patient Position: Sitting, Cuff Size: Adult Large)  Pulse 71  Wt 110.2 kg (243 lb)  SpO2 97%  BMI 35.18 kg/m2 Estimated body mass index is 35.18 kg/(m^2) as calculated from the following:    Height as of 5/1/18: 1.77 m (5' 9.69\").    Weight as of this encounter: 110.2 kg (243 lb)..  BP completed using cuff size: large    Lay Chávez L.P.N.    "

## 2018-09-11 NOTE — MR AVS SNAPSHOT
After Visit Summary   9/11/2018    Wilfrid Vickers    MRN: [id number]           Patient Information     Date Of Birth          1975        Visit Information        Provider Department      9/11/2018 11:30 AM Manny Subramanian MD [facility] PHYSICIANS HEART AT [facility]        Today's Diagnoses     PVC's (premature ventricular contractions)    -  1    Hyperlipidemia LDL goal <70          Care Instructions    Thank you for coming to the [facility] Heart @ [facility]; please note the following instructions:    1. You have been scheduled for a fasting lab appointment; please see following pages for appointment detail information.  Please fast for 10-12 hours prior to your scheduled lab appointment. You will be notified of your results within 7-10 business days (please see result notification details at bottom of summary).    2. We will apply a holter (heart) monitor for you to wear for 14 days; please see following pages for appointment detail information.    3.  Dr. Manny Subramanian has ordered a Calcium Score Test (CT Scan) to be completed; please see following pages for appointment detail information.        If you have any questions regarding your visit please contact your care team:     Cardiology  Telephone Number   Annie SEGURA, RN  Tae TINSLEY, RN   [place] RENTERIA, AARTI ZAPATA, MA  Lay SUERO, N   [phone]    *After hours: [phone]   For scheduling appts:     [phone] or    [phone] (select option 1)    *After hours: [phone]     For the Device Clinic (Pacemakers and ICD's)  RN's :  Mckayla Branch   During business hours: [phone]    *After business hours:  [phone] (select option 4)      Normal test result notifications will be released via SHEEX or mailed within 7 business days.  All other test results, will be communicated via telephone once reviewed by your cardiologist.    If you need a medication refill please contact your pharmacy.   Please allow 3 business days for your refill to be completed.    As always, thank you for trusting us with your health care needs!                Follow-ups after your visit        Your next 10 appointments already scheduled     Sep 26, 2018  9:00 AM CDT   LAB with FK LAB   [facility]    [address] [place] [id number]   [phone]           Please do not eat 10-12 hours before your appointment if you are coming in fasting for labs on lipids, cholesterol, or glucose (sugar). This does not apply to pregnant women. Water, hot tea and black coffee (with nothing added) are okay. Do not drink other fluids, diet soda or chew gum.            Sep 26, 2018  9:15 AM CDT   Nurse Only with FK ANCILLARY CARDS   [facility] PHYSICIANS HEART AT [facility] ([facility])    [address] [place] [address] 2nd Floor  [facility] [address]   [phone]            Sep 28, 2018  9:00 AM CDT   CT CALCIUM SCREENING with UUCT4   [place] ([facility])    [address] [facility] [id number] [phone]           How do I prepare for my exam? (Food and drink instructions) It is best to avoid caffeine on the day of your test.  What should I wear: Please wear loose clothing, such as a sweat suit or jogging clothes. Avoid snaps, zippers and other metal. We may ask you to undress and put on a hospital gown.  How long does the exam take: The entire exam will take about 30 minutes or less.  What should I bring: Please bring a list of your current medicines to your exam. (Include vitamins, minerals and over-the-counter medicines.) It is safest to leave personal items at home.  Do I need a : No  is needed.  What do I need to tell my doctor: Be sure to tell your doctor if there is a chance you may be pregnant.  What should I do after the exam: No restrictions, You may resume normal activities.  What  is this test: A calcium scoring CT (computed tomography) scan is a painless, highly sensitive test that shows the amount of calcium build-up in your heart arteries. This tells us your future risk for heart attack. The CT scan is a series of pictures that allows us to look at your heart. Our scanner creates images of the heart in cross sections, much like slices of bread. A heart scan should not take the place of your routine doctor visits.  Who should I call with questions: If you have any questions, please call the Imaging Department where you will have your exam. Directions, parking instructions, and other information is available on our website, [email or web address].              Future tests that were ordered for you today     Open Future Orders        Priority Expected Expires Ordered    Radiologist Consult For Cardiology Routine 9/11/2018 9/11/2019 9/11/2018    CT Coronary Calcium Scan Routine 9/11/2018 9/11/2019 9/11/2018    Lipid panel reflex to direct LDL Fasting Routine 9/11/2018 9/11/2019 9/11/2018    Zio Patch Holter Routine 9/11/2018 8/10/2019 9/11/2018            Who to contact     If you have questions or need follow up information about today's clinic visit or your schedule please contact [facility] PHYSICIANS HEART AT [facility] directly at [phone].  Normal or non-critical lab and imaging results will be communicated to you by AdsIthart, letter or phone within 4 business days after the clinic has received the results. If you do not hear from us within 7 days, please contact the clinic through AdsIthart or phone. If you have a critical or abnormal lab result, we will notify you by phone as soon as possible.  Submit refill requests through Osper or call your pharmacy and they will forward the refill request to us. Please allow 3 business days for your refill to be completed.          Additional Information About Your Visit        Osper Information     Osper gives you secure  access to your electronic health record. If you see a primary care provider, you can also send messages to your care team and make appointments. If you have questions, please call your primary care clinic.  If you do not have a primary care provider, please call [phone] and they will assist you.        Care EveryWhere ID     This is your Care EveryWhere ID. This could be used by other organizations to access your [place] medical records  [id number]        Your Vitals Were     Pulse Pulse Oximetry BMI (Body Mass Index)             70 97% 35.18 kg/m2          Blood Pressure from Last 3 Encounters:   09/11/18 (!) 139/97   05/01/18 120/80   04/27/18 128/86    Weight from Last 3 Encounters:   09/11/18 110.2 kg (243 lb)   05/01/18 106.6 kg (235 lb)   04/19/18 110.1 kg (242 lb 12.8 oz)               Primary Care Provider Office Phone # Fax #    Guzman Walter [phone]       [address] [facility] [address]        Equal Access to Services     [facility]: Hadii aad ku hadasho Soomaali, waaxda luqadaha, qaybta kaalmada adesteveyafrancisco, ro mills . So [facility] [phone].    ATENCIÓN: Si habla español, tiene a slade disposición servicios gratuitos de asistencia lingüística. [facility] [phone].    We comply with applicable federal civil rights laws and [place] laws. We do not discriminate on the basis of race, color, national origin, age, disability, sex, sexual orientation, or gender identity.            Thank you!     Thank you for choosing [facility] PHYSICIANS HEART AT [facility]  for your care. Our goal is always to provide you with excellent care. Hearing back from our patients is one way we can continue to improve our services. Please take a few minutes to complete the written survey that you may receive in the mail after your visit with us. Thank you!             Your Updated Medication List - Protect others around you:  Learn how to safely use, store and throw away your medicines at www.disposemymeds.org.          This list is accurate as of 9/11/18  3:56 PM.  Always use your most recent med list.                   Brand Name Dispense Instructions for use Diagnosis    ALPRAZolam 0.5 MG tablet    XANAX    20 tablet    Take 1 tablet (0.5 mg) by mouth 3 times daily as needed for anxiety (As needed 15mins prior to take-off)    EILEEN (generalized anxiety disorder), Fear of flying       amLODIPine-benazepril 5-20 MG per capsule    LOTREL    30 capsule    Take 1 capsule by mouth daily Office visit needed for future refills    Benign essential hypertension       * azithromycin 250 MG tablet    ZITHROMAX    6 tablet    Two tablets first day, then one tablet daily for four days.    Pneumonia due to infectious organism, unspecified laterality, unspecified part of lung       * azithromycin 250 MG tablet    ZITHROMAX    4 tablet    Take 1 tablet daily to extend treatment.    Community acquired pneumonia, unspecified laterality       loratadine 10 MG tablet    CLARITIN     Take by mouth daily Otc. Mg. unknown        MELATONIN PO           * Notice:  This list has 2 medication(s) that are the same as other medications prescribed for you. Read the directions carefully, and ask your doctor or other care provider to review them with you.

## 2018-09-11 NOTE — PATIENT INSTRUCTIONS
Thank you for coming to the [facility] @ [facility]; please note the following instructions:    1. You have been scheduled for a fasting lab appointment; please see following pages for appointment detail information.  Please fast for 10-12 hours prior to your scheduled lab appointment. You will be notified of your results within 7-10 business days (please see result notification details at bottom of summary).    2. We will apply a holter (heart) monitor for you to wear for 14 days; please see following pages for appointment detail information.    3.  Dr. Manny Subramanian has ordered a Calcium Score Test (CT Scan) to be completed; please see following pages for appointment detail information.        If you have any questions regarding your visit please contact your care team:     Cardiology  Telephone Number   Annie SEGURA, RN  Tae TINSLEY, RN   [place] RENTERIA, AARTI ZAPATA, MA  Lay SUERO, LPN   [phone]    *After hours: [phone]   For scheduling appts:     [phone] or    [phone] (select option 1)    *After hours: [phone]     For the Device Clinic (Pacemakers and ICD's)  RN's :  Mckayla Branch   During business hours: [phone]    *After business hours:  [phone] (select option 4)      Normal test result notifications will be released via Douguo or mailed within 7 business days.  All other test results, will be communicated via telephone once reviewed by your cardiologist.    If you need a medication refill please contact your pharmacy.  Please allow 3 business days for your refill to be completed.    As always, thank you for trusting us with your health care needs!

## 2018-09-11 NOTE — PROGRESS NOTES
Referring provider: Guzman Walter MD  Chief complaint: palpitations and high blood pressure    HPI: Mr. Wilfrid Vickers is a 42 year old  male with PMH significant for PVCs and hypertension.     has been lately feeling foggy in his head and lightheaded and have been measuring his BP higher than normal. He thinks his BP has been slowly creeping up over the last 3 years and during the last 6 months it has been higher than before. He has been to the ED on 2/3/2018 because he was feeling headache and his BP was 200/125 mmHg. He was not on any medication at that point. When he was in the ED his BP was 174/118 mmHg. Labetolol 10 mg IV was given and his BP came down to 135/98 mmHg and he was sent home with 25 mg of metoprolol. His head CT was normal. He started taking that since then however his BP yesterday was 161/110 mmHg in the morning and today he measured it 151/105 mmHg.     He has been feeling palpitations during the last 1.5 years and has been diagnosed with PVCs when he was [place]. He was on metoprolol for some time but did not benefit from that and stopped one month ago. He feels palpitations as skipped beats mostly aggrevated by caffeine and beer and he stopped drinking those. He exercises regularly and working to lose some weight. He feels very well during exercise, however after he stops exercising he starts feeling more skipped beats. He states he suddenly feels the onset of skipped beats. The frequency of the episodes varies, last week he felt skipped beats on 2 days of the week.     He denies exertional CP, FELIX, LE edema, palpitations, PND, ortopnea or syncope. His HbA1c is 6.9 on 2/3/2018, but the patient refuses to get further treatment for that. He states the test was done without his consent. He states if he loses weight he will decrease his HbA1c. His LDL is 216 mg/dl and he again wants to exercise and lose weight and doesnot want to be on statin for now.He doesnot  have any other prior cardiac history. He is a non-smoker. No FH of CAD or SCD    His echocardiogram from 2016 shows a structurally normal heart. I have also reviewed his ECG from 2/3/2018 which shows normal sinus rhythm, no premature beats.    Medications, personal, family, and social history reviewed with patient and revised.    Interval follow-up 9/11/2018:  The patient is here for six-month follow-up.  He still feels skipped beats (PVCs) type of palpitations almost every day sometimes lasting a few hours.  He reports having more PVCs particularly after eating, drinking alcohol or coffee smoking marijuana.  He denies worsening palpitations with activity, associated lightheaded or dizzy feeling.  He reports being anxious with these skipped beats.  The patient reports feeling mildly dizzy or 5 he had almost every day sitting and standing.  He correlates more dizziness with high carbohydrate meals.  No syncope.  He has atypical chest pain unrelated to exertion only at one spot on the right side of his chest.  He denies dyspnea on exertion.  He walks every day 45 minutes with his dog.    PAST MEDICAL HISTORY:  Hypertension  PVCs  Diabetes    CURRENT MEDICATIONS:  Amlodipine benazepril 5/20 mg  Melatonin  Xanax as needed.    PAST SURGICAL HISTORY:  Past Surgical History:   Procedure Laterality Date     ARTHROPLASTY SHOULDER Left     torn labrum, rotator cuff repair       ALLERGIES:     Allergies   Allergen Reactions     Amoxicillin      Rash     Erythromycin      Vomiting       FAMILY HISTORY:  Family History   Problem Relation Age of Onset     Cancer Father      Prostate     Allergies Mother          SOCIAL HISTORY:  Social History   Substance Use Topics     Smoking status: Never Smoker     Smokeless tobacco: Never Used     Alcohol use Yes      Comment: 6 pack per weekend twice monthly       ROS:   Constitutional: No fever, chills, or sweats. Weight stable.   ENT: No visual disturbance, ear ache, epistaxis, sore  throat.   Cardiovascular: As per HPI.   Respiratory:Dry cough+, hemoptysis.    GI: No nausea, vomiting, hematemesis, melena, or hematochezia.   : No hematuria.   Integument: Negative.   Psychiatric: Negative.   Hematologic: No easy bruising, no easy bleeding.  Neuro: Negative.   Endocrinology: No significant heat or cold intolerance   Musculoskeletal: No myalgia.    Exam:  BP (!) 139/97 (BP Location: Left arm, Patient Position: Sitting, Cuff Size: Adult Large)  Pulse 70  Wt 110.2 kg (243 lb)  SpO2 97%  BMI 35.18 kg/m2  GENERAL APPEARANCE: healthy, alert and no distress  HEENT: no icterus, no central cyanosis  LYMPH/NECK: no adenopathy, no asymmetry, JVP not elevated, no carotid bruits.  RESPIRATORY: lungs clear to auscultation - no rales, rhonchi or wheezes, no use of accessory muscles, no retractions, respirations are unlabored, normal respiratory rate  CARDIOVASCULAR: regular rhythm, normal S1, S2, no S3 or S4 and no murmur, click or rub, precordium quiet with normal PMI.  GI: soft, non tender  EXTREMITIES: peripheral pulses normal, no edema  NEURO: alert and oriented to person/place/time, normal speech,and affect  VASC: Radial, dorsalis pedis and posterior tibialis pulses are normal in volumes and symmetric bilaterally.   SKIN: no ecchymoses, no rashes     I have reviewed the labs and personally reviewed the imaging below (EKG) and made my comment in the assessment and plan.    Labs:  CBC RESULTS:   Lab Results   Component Value Date    WBC 9.4 02/03/2018    RBC 5.03 02/03/2018    HGB 16.0 02/03/2018    HCT 47.7 02/03/2018    MCV 95 02/03/2018    MCH 31.8 02/03/2018    MCHC 33.5 02/03/2018    RDW 12.3 02/03/2018     02/03/2018       BMP RESULTS:  Lab Results   Component Value Date     03/28/2018    POTASSIUM 4.0 03/28/2018    CHLORIDE 105 03/28/2018    CO2 24 03/28/2018    ANIONGAP 11 03/28/2018     (H) 03/28/2018    BUN 16 03/28/2018    CR 1.05 03/28/2018    GFRESTIMATED 78 03/28/2018     GFRESTBLACK >90 03/28/2018    HEMANT 9.7 03/28/2018        Transthoracic Echocardiogram 9/16/2016  Left ventricular size is normal.  The Ejection Fraction is estimated at 50-55%.  Right ventricular function, chamber size, wall motion, and thickness are  normal.  Pulmonary artery systolic pressure cannot be assessed.  No pericardial effusion is present.  No significant valvular dysfunction noted.    EKG 2/3/2018 normal    Assessment and Plan:   Mr. Wilfrid Vickers is a 42 year old  male with PMH significant for PVCs and hypertension. His physical exam is normal. His BP is high in the office today.    1. Systemic hypertension: No well controlled. I will start lisinopril-HCTZ (10/12.5 mg) qday with BMP check in one week. He wanted to come off metoprolol since he doesnot think it effects his skipped beats. I have discontinued that. He will continue to track his BP and send us one week BP readings in one month time.    2. PVCs: He feels them from time to time. No syncope. No underlying structural heart disease. His CBC, electrolyes (Na, K) and TSH are all normal. Normal kidney function. PVCs have not been captured on ECG. I did not detect ectopic beats during my physical exam today. He has brought rhythm strip with him today which was taken 1.5 years ago in [place]. It shows frequent PVCs. I will control his BP first and then have him undergo Zio-PATCH monitoring if he is still symptomtatic of PVCs.    3. HbA1c: Measured 6.9 on 2/3/2019. He doesnot want any further investigation or treatment for DM.    4. Hyperlipidemia: His LDL is 216 mg/dl. He doesnot want to be on statin. He wants to exercise and lose weight. I will re-check his lipid profile in 6 months time.    Plan:  -Start lisinopril-HCTZ combination 10/12.5 mg  -Discontinue metoprolol  -BMP in one week (ACE inhibitor started today)  -My Chart message in one month with BP readings  -F/u in cardiology in 6 months time with repeat lipid profile.    It was my  absolute pleasure to meet  in the office today. Please donot hesitate to contact me if you have any questions or concerns.    Manny CARTER MD  [facility] Division of Cardiology  Pager [phone]

## 2018-09-12 NOTE — PROGRESS NOTES
Referring provider: Guzman Walter MD  Chief complaint: palpitations and high blood pressure    HPI: Mr. Wilfrid Vickers is a 42 year old  male with PMH significant for PVCs and hypertension.     has been lately feeling foggy in his head and lightheaded and have been measuring his BP higher than normal. He thinks his BP has been slowly creeping up over the last 3 years and during the last 6 months it has been higher than before. He has been to the ED on 2/3/2018 because he was feeling headache and his BP was 200/125 mmHg. He was not on any medication at that point. When he was in the ED his BP was 174/118 mmHg. Labetolol 10 mg IV was given and his BP came down to 135/98 mmHg and he was sent home with 25 mg of metoprolol. His head CT was normal. He started taking that since then however his BP yesterday was 161/110 mmHg in the morning and today he measured it 151/105 mmHg.     He has been feeling palpitations during the last 1.5 years and has been diagnosed with PVCs when he was [place]. He was on metoprolol for some time but did not benefit from that and stopped one month ago. He feels palpitations as skipped beats mostly aggrevated by caffeine and beer and he stopped drinking those. He exercises regularly and working to lose some weight. He feels very well during exercise, however after he stops exercising he starts feeling more skipped beats. He states he suddenly feels the onset of skipped beats. The frequency of the episodes varies, last week he felt skipped beats on 2 days of the week.     He denies exertional CP, FELIX, LE edema, palpitations, PND, ortopnea or syncope. His HbA1c is 6.9 on 2/3/2018, but the patient refuses to get further treatment for that. He states the test was done without his consent. He states if he loses weight he will decrease his HbA1c. His LDL is 216 mg/dl and he again wants to exercise and lose weight and doesnot want to be on statin for now.He doesnot  have any other prior cardiac history. He is a non-smoker. No FH of CAD or SCD    His echocardiogram from 2016 shows a structurally normal heart. I have also reviewed his ECG from 2/3/2018 which shows normal sinus rhythm, no premature beats.    Medications, personal, family, and social history reviewed with patient and revised.    Interval follow-up 9/11/2018:  The patient is here for six-month follow-up.  He still feels premature beats (likely PVCs based on a previous ECG ) almost every day sometimes lasting a few hours.  He reports having more PVCs particularly after eating, drinking alcohol or coffee, smoking marijuana. He denies worsening palpitations with activity, associated lightheaded or dizzy feeling.  He feels anxious with these skipped beats.  The patient reports feeling mildly dizzy almost every day sitting or standing.  He correlates more dizziness with high carbohydrate meals.  No syncope.  He has atypical chest pain unrelated to exertion only at one spot on the right side of his chest.  He denies dyspnea on exertion.  He walks every day 45 minutes with his dog.    PAST MEDICAL HISTORY:  Hypertension  PVCs  Diabetes  HL    CURRENT MEDICATIONS:  Amlodipine benazepril 5/20 mg  Melatonin  Xanax as needed.    PAST SURGICAL HISTORY:  Past Surgical History:   Procedure Laterality Date     ARTHROPLASTY SHOULDER Left     torn labrum, rotator cuff repair       ALLERGIES:     Allergies   Allergen Reactions     Amoxicillin      Rash     Erythromycin      Vomiting       FAMILY HISTORY:  Family History   Problem Relation Age of Onset     Cancer Father      Prostate     Allergies Mother          SOCIAL HISTORY:  Social History   Substance Use Topics     Smoking status: Never Smoker     Smokeless tobacco: Never Used     Alcohol use Yes      Comment: 6 pack per weekend twice monthly       ROS:   Constitutional: No fever, chills, or sweats. Weight stable.   ENT: No visual disturbance, ear ache, epistaxis, sore throat.    Cardiovascular: As per HPI.   Respiratory:Dry cough+, hemoptysis.    GI: No nausea, vomiting, hematemesis, melena, or hematochezia.   : No hematuria.   Integument: Negative.   Hematologic: No easy bruising, no easy bleeding.  Neuro: Negative.   Endocrinology: No significant heat or cold intolerance   Musculoskeletal: No myalgia.    Exam:  BP (!) 139/97 (BP Location: Left arm, Patient Position: Sitting, Cuff Size: Adult Large)  Pulse 70  Wt 110.2 kg (243 lb)  SpO2 97%  BMI 35.18 kg/m2  GENERAL APPEARANCE: alert and no distress  HEENT: no icterus, no central cyanosis  LYMPH/NECK: JVP not elevated  RESPIRATORY: lungs clear to auscultation - no rales, rhonchi or wheezes, no use of accessory muscles, no retractions, respirations are unlabored, normal respiratory rate  CARDIOVASCULAR: regular rhythm, normal S1, S2, no S3 or S4 and no murmur, click or rub, precordium quiet with normal PMI.  GI: soft, non tender  EXTREMITIES: no edema  NEURO: alert and oriented to person/place/time, normal speech,and affect  SKIN: no ecchymoses, no rashes     I have reviewed the labs and personally reviewed the imaging below (EKG) and made my comment in the assessment and plan.    Labs:  CBC RESULTS:   Lab Results   Component Value Date    WBC 9.4 02/03/2018    RBC 5.03 02/03/2018    HGB 16.0 02/03/2018    HCT 47.7 02/03/2018    MCV 95 02/03/2018    MCH 31.8 02/03/2018    MCHC 33.5 02/03/2018    RDW 12.3 02/03/2018     02/03/2018       BMP RESULTS:  Lab Results   Component Value Date     03/28/2018    POTASSIUM 4.0 03/28/2018    CHLORIDE 105 03/28/2018    CO2 24 03/28/2018    ANIONGAP 11 03/28/2018     (H) 03/28/2018    BUN 16 03/28/2018    CR 1.05 03/28/2018    GFRESTIMATED 78 03/28/2018    GFRESTBLACK >90 03/28/2018    HEMANT 9.7 03/28/2018        Transthoracic Echocardiogram 9/16/2016  Left ventricular size is normal.  The Ejection Fraction is estimated at 50-55%.  Right ventricular function, chamber size, wall  motion, and thickness are  normal.  Pulmonary artery systolic pressure cannot be assessed.  No pericardial effusion is present.  No significant valvular dysfunction noted.    EKG 2/3/2018 normal    Assessment and Plan:   Mr. Wilfrid Vickers is a 42 year old  male with PMH significant for PVCs and hypertension. His physical exam is normal.     1. Systemic hypertension: Well controlled now after uptitrating benazepril-lisinopril to 5-20 mg.    2. PVCs: He feels them everyday. No syncope. No underlying structural heart disease. His CBC, electrolyes (Na, K) and TSH are all normal. Normal kidney function. Recommended Zio-Patch for 2 weeks.    3. HbA1c: Measured 6.9 on 2/3/2019. He doesnot want any further investigation or treatment for DM.    4. Hyperlipidemia: His LDL is 216 mg/dl FROM 11/2017. Today he has changed his mind and with a repeat lipid check if LDL is high he wants to be on statin. I also recommended CAC since he has intermediate risk according to [place] risk score (10.1% risk ).    Plan:  -Continue benazepril-lisinopril 5-20 mg  -Recheck lipid profile  -CAC score if >100 he will need ASA 81 mg.  -Zio-patch for 2 weeks for PVC burden.    Will review results and f/u accordingly.    It was my absolute pleasure to meet  in the office today. Please donot hesitate to contact me if you have any questions or concerns.    Manny CARTER MD  [facility] Division of Cardiology  Pager [phone]

## 2018-09-13 ENCOUNTER — MYC MEDICAL ADVICE (OUTPATIENT)
Dept: CARDIOLOGY | Facility: CLINIC | Age: 43
End: 2018-09-13

## 2018-09-13 DIAGNOSIS — I10 BENIGN ESSENTIAL HYPERTENSION: ICD-10-CM

## 2018-09-13 RX ORDER — AMLODIPINE AND BENAZEPRIL HYDROCHLORIDE 5; 20 MG/1; MG/1
1 CAPSULE ORAL DAILY
Qty: 90 CAPSULE | Refills: 3 | Status: SHIPPED | OUTPATIENT
Start: 2018-09-13 | End: 2018-10-31

## 2018-09-13 RX ORDER — AMLODIPINE AND BENAZEPRIL HYDROCHLORIDE 5; 20 MG/1; MG/1
1 CAPSULE ORAL DAILY
Qty: 30 CAPSULE | Refills: 1 | Status: CANCELLED | OUTPATIENT
Start: 2018-09-13

## 2018-09-13 NOTE — TELEPHONE ENCOUNTER
Pending Prescriptions:                       Disp   Refills    amLODIPine-benazepril (LOTREL) 5-20 MG pe*30 cap*1            Sig: Take 1 capsule by mouth daily Office visit needed           for future refills    Last Visit 9/11/18 w/Can  Last Filled 8/21/18  Quantity 30  Req. Received 9/13/18  Nancy Turcios MA

## 2018-09-13 NOTE — LETTER
September 27, 2018          Wilfrid Vickers,  [address] [address]        Dear Wilfrid Vickers      Monitoring and managing your preventative and chronic health conditions are very important to us. Our records indicate that you have not scheduled for Appointment with your provider for an A1C and blood pressure check which was recommended by Dr. Walter.      If you have received your health care elsewhere, please call the clinic so the information can be documented in your chart.    Please call [phone] or message us through your [employer] account to schedule an appointment or provide information for your chart.     Feel free to contact us if you have any questions or concerns!    I look forward to seeing you and working with you on your health care needs.     Sincerely,         Guzman Walter / OUSMANE

## 2018-09-18 NOTE — TELEPHONE ENCOUNTER
Rx already filled:    amLODIPine-benazepril (LOTREL) 5-20 MG per capsule 90 capsule 3 9/13/2018  No      Sig - Route: Take 1 capsule by mouth daily - Oral     Class: E-Prescribe     Order: [id number]     E-Prescribing Status: Receipt confirmed by pharmacy (9/13/2018 11:59 AM CDT)

## 2018-09-25 ENCOUNTER — MYC MEDICAL ADVICE (OUTPATIENT)
Dept: CARDIOLOGY | Facility: CLINIC | Age: 43
End: 2018-09-25

## 2018-09-25 NOTE — TELEPHONE ENCOUNTER
As a follow-up of patient My chart message: << Mima Subramanian,       I am scheduled to come in and get a heart monitor tomorrow.  I am thinking of taking an impromtu vacation next week, [place] to [place] ([place]   I know this will have an effect on my baseline heart rate for a couple of days while I acclimate.   Is this a problem for the type of monitoring you want to do?  Should we postpone until after I get back? >>    Writer check with Dr Subramanian it is ok for patient to do Zio monitor when he gets back from vacation. Patient is coming back on 10.07 will call clinic to schedule another appointment.Tanya Hui RN

## 2018-09-26 ENCOUNTER — ALLIED HEALTH/NURSE VISIT (OUTPATIENT)
Dept: NURSING | Facility: CLINIC | Age: 43
End: 2018-09-26
Payer: COMMERCIAL

## 2018-09-26 DIAGNOSIS — E78.5 HYPERLIPIDEMIA LDL GOAL <70: ICD-10-CM

## 2018-09-26 DIAGNOSIS — I49.3 PVC'S (PREMATURE VENTRICULAR CONTRACTIONS): ICD-10-CM

## 2018-09-26 DIAGNOSIS — E78.5 HYPERLIPIDEMIA WITH TARGET LDL LESS THAN 70: Primary | ICD-10-CM

## 2018-09-26 LAB
CHOLEST SERPL-MCNC: 241 MG/DL
HDLC SERPL-MCNC: 37 MG/DL
LDLC SERPL CALC-MCNC: 177 MG/DL
NONHDLC SERPL-MCNC: 204 MG/DL
TRIGL SERPL-MCNC: 136 MG/DL

## 2018-09-26 PROCEDURE — 0298T ZZC EXT ECG > 48HR TO 21 DAY REVIEW AND INTERPRETATN: CPT | Mod: ZP | Performed by: INTERNAL MEDICINE

## 2018-09-26 PROCEDURE — 99207 ZZC NO CHARGE LOS: CPT

## 2018-09-26 PROCEDURE — 0296T ZIO PATCH HOLTER: CPT

## 2018-09-26 PROCEDURE — 80061 LIPID PANEL: CPT | Performed by: INTERNAL MEDICINE

## 2018-09-26 PROCEDURE — 36415 COLL VENOUS BLD VENIPUNCTURE: CPT | Performed by: INTERNAL MEDICINE

## 2018-09-26 RX ORDER — ATORVASTATIN CALCIUM 10 MG/1
10 TABLET, FILM COATED ORAL DAILY
Qty: 90 TABLET | Refills: 3 | Status: SHIPPED | OUTPATIENT
Start: 2018-09-26

## 2018-09-26 NOTE — NURSING NOTE
Per Dr. Subramanian, patient came in and had 14 day zio patch put on.  Patient instructed in use.  Diary use explained.  Questions answered.  Tiffanie CRANE CMA ([facility])

## 2018-09-26 NOTE — MR AVS SNAPSHOT
After Visit Summary   9/26/2018    Wilfrid Vickers    MRN: [id number]           Patient Information     Date Of Birth          1975        Visit Information        Provider Department      9/26/2018 10:00 AM [facility]        Today's Diagnoses     PVC's (premature ventricular contractions)           Follow-ups after your visit        Your next 10 appointments already scheduled     Sep 28, 2018  9:00 AM CDT   CT CALCIUM SCREENING with UUCT4   [facility], [place], [place] ([facility], [facility])    [address] [address] [phone]           How do I prepare for my exam? (Food and drink instructions) It is best to avoid caffeine on the day of your test.  What should I wear: Please wear loose clothing, such as a sweat suit or jogging clothes. Avoid snaps, zippers and other metal. We may ask you to undress and put on a hospital gown.  How long does the exam take: The entire exam will take about 30 minutes or less.  What should I bring: Please bring a list of your current medicines to your exam. (Include vitamins, minerals and over-the-counter medicines.) It is safest to leave personal items at home.  Do I need a : No  is needed.  What do I need to tell my doctor: Be sure to tell your doctor if there is a chance you may be pregnant.  What should I do after the exam: No restrictions, You may resume normal activities.  What is this test: A calcium scoring CT (computed tomography) scan is a painless, highly sensitive test that shows the amount of calcium build-up in your heart arteries. This tells us your future risk for heart attack. The CT scan is a series of pictures that allows us to look at your heart. Our scanner creates images of the heart in cross sections, much like slices of bread. A heart scan should not take the place of your routine doctor visits.  Who should I call with questions: If you have any  questions, please call the Imaging Department where you will have your exam. Directions, parking instructions, and other information is available on our website, [email or web address].              Who to contact     If you have questions or need follow up information about today's clinic visit or your schedule please contact [facility] directly at [phone].  Normal or non-critical lab and imaging results will be communicated to you by MyChart, letter or phone within 4 business days after the clinic has received the results. If you do not hear from us within 7 days, please contact the clinic through [employer] or phone. If you have a critical or abnormal lab result, we will notify you by phone as soon as possible.  Submit refill requests through [employer] or call your pharmacy and they will forward the refill request to us. Please allow 3 business days for your refill to be completed.          Additional Information About Your Visit        [employer] Information     [employer] gives you secure access to your electronic health record. If you see a primary care provider, you can also send messages to your care team and make appointments. If you have questions, please call your primary care clinic.  If you do not have a primary care provider, please call [phone] and they will assist you.        Care EveryWhere ID     This is your Care EveryWhere ID. This could be used by other organizations to access your [facility] medical records  [id number]         Blood Pressure from Last 3 Encounters:   09/11/18 (!) 139/97   05/01/18 120/80   04/27/18 128/86    Weight from Last 3 Encounters:   09/11/18 243 lb (110.2 kg)   05/01/18 235 lb (106.6 kg)   04/19/18 242 lb 12.8 oz (110.1 kg)              We Performed the Following     Zio Patch Holter        Primary Care Provider Office Phone # Fax #    Guzman Walter [phone] [address] [place] [address]        Equal  Access to Services     [facility]: Hadii dayna rodgers slava Black, wapaoloda luqadaha, qaybta kaalmafrancisco machadodaniellefrancisco, ro mccannharriettnick winn. So [facility] [phone].    ATENCIÓN: Si habla booker, tiene a slade disposición servicios gratuitos de asistencia lingüística. Llame al [phone].    We comply with applicable federal civil rights laws and [place] laws. We do not discriminate on the basis of race, color, national origin, age, disability, sex, sexual orientation, or gender identity.            Thank you!     Thank you for choosing [facility]  for your care. Our goal is always to provide you with excellent care. Hearing back from our patients is one way we can continue to improve our services. Please take a few minutes to complete the written survey that you may receive in the mail after your visit with us. Thank you!             Your Updated Medication List - Protect others around you: Learn how to safely use, store and throw away your medicines at www.disposemymeds.org.          This list is accurate as of 9/26/18 10:04 AM.  Always use your most recent med list.                   Brand Name Dispense Instructions for use Diagnosis    ALPRAZolam 0.5 MG tablet    XANAX    20 tablet    Take 1 tablet (0.5 mg) by mouth 3 times daily as needed for anxiety (As needed 15mins prior to take-off)    EILEEN (generalized anxiety disorder), Fear of flying       amLODIPine-benazepril 5-20 MG per capsule    LOTREL    90 capsule    Take 1 capsule by mouth daily    Benign essential hypertension       * azithromycin 250 MG tablet    ZITHROMAX    6 tablet    Two tablets first day, then one tablet daily for four days.    Pneumonia due to infectious organism, unspecified laterality, unspecified part of lung       * azithromycin 250 MG tablet    ZITHROMAX    4 tablet    Take 1 tablet daily to extend treatment.    Community acquired pneumonia, unspecified laterality       loratadine 10 MG tablet    CLARITIN      Take by mouth daily Otc. Mg. unknown        MELATONIN PO           * Notice:  This list has 2 medication(s) that are the same as other medications prescribed for you. Read the directions carefully, and ask your doctor or other care provider to review them with you.

## 2018-09-27 ENCOUNTER — TELEPHONE (OUTPATIENT)
Dept: CARDIOLOGY | Facility: CLINIC | Age: 43
End: 2018-09-27

## 2018-09-27 ENCOUNTER — TELEPHONE (OUTPATIENT)
Dept: FAMILY MEDICINE | Facility: CLINIC | Age: 43
End: 2018-09-27

## 2018-09-27 DIAGNOSIS — E11.9 TYPE 2 DIABETES MELLITUS WITHOUT COMPLICATION, WITHOUT LONG-TERM CURRENT USE OF INSULIN (H): ICD-10-CM

## 2018-09-27 NOTE — TELEPHONE ENCOUNTER
Patient called with Lipid panel result per Dr Subramanian:     << I recommend to start atorvastatin 10 mg daily and recheck lipid profile in 3 months.I will send the prescription to your pharmacy >>.      Patient confirmed reception of My chart message sent by Dr Subramanian and will contact clinic wit any questions.    Tanya Hui RN

## 2018-09-27 NOTE — LETTER
October 2, 2018          Wilfrid Vickers,  [address] [address]        Dear Wilfrid Vickers      Monitoring and managing your preventative and chronic health conditions are very important to us. Our records indicate that you have not scheduled for Diabetic Check and Blood Pressure Check  which was recommended by Dr. Walter.      If you have received your health care elsewhere, please call the clinic so the information can be documented in your chart.    Please call [phone] or message us through your [employer] account to schedule an appointment or provide information for your chart.     Feel free to contact us if you have any questions or concerns!    I look forward to seeing you and working with you on your health care needs.     Sincerely,         Guzman Walter/ YOHANNES

## 2018-09-27 NOTE — TELEPHONE ENCOUNTER
Panel Management Review      Patient has the following on his problem list:     Hypertension   Last three blood pressure readings:  BP Readings from Last 3 Encounters:   09/11/18 (!) 139/97   05/01/18 120/80   04/27/18 128/86     Blood pressure: FAILED    HTN Guidelines:  Age 18-59 BP range:  Less than 140/90  Age 60-85 with Diabetes:  Less than 140/90  Age 60-85 without Diabetes:  less than 150/90      Composite cancer screening  Chart review shows that this patient is due/due soon for the following None  Summary:    Patient is due/failing the following:   A1C and BP CHECK    Action needed:   Routed to provider for review.    Type of outreach:    None, routed to provider for review.    Questions for provider review:    Patient is failing diabetes list and doesn't have diabetes on problem list. Please update problem list. Once complete please route back to team to complete blood pressure check.                                                                                                                                     Lashae Mcguire CMA on 9/27/2018 at 10:11 AM       Chart routed to Provider .

## 2018-09-27 NOTE — TELEPHONE ENCOUNTER
Reason for Call:  Other returning call    Detailed comments: Please try again.    Phone Number Patient can be reached at: Home number on file [phone] (home)    Best Time: ASAP    Can we leave a detailed message on this number? YES    Call taken on 9/27/2018 at 3:57 PM by Romy Montague

## 2018-09-27 NOTE — TELEPHONE ENCOUNTER
Panel Management Review      Patient has the following on his problem list:     Diabetes    ASA: Failed    Last A1C  Lab Results   Component Value Date    A1C 6.9 02/03/2018     A1C tested: FAILED    Last LDL:    Lab Results   Component Value Date    CHOL 241 09/26/2018     Lab Results   Component Value Date    HDL 37 09/26/2018     Lab Results   Component Value Date     09/26/2018     Lab Results   Component Value Date    TRIG 136 09/26/2018     No results found for: CHOLHDLRATIO  Lab Results   Component Value Date    NHDL 204 09/26/2018       Is the patient on a Statin? YES  Is the patient on Aspirin? NO    Medications     HMG CoA Reductase Inhibitors    atorvastatin (LIPITOR) 10 MG tablet          Last three blood pressure readings:  BP Readings from Last 3 Encounters:   09/11/18 (!) 139/97   05/01/18 120/80   04/27/18 128/86       Date of last diabetes office visit: 9/11/2018     Tobacco History:     History   Smoking Status     Never Smoker   Smokeless Tobacco     Never Used         Hypertension   Last three blood pressure readings:  BP Readings from Last 3 Encounters:   09/11/18 (!) 139/97   05/01/18 120/80   04/27/18 128/86     Blood pressure: FAILED    HTN Guidelines:  Age 18-59 BP range:  Less than 140/90  Age 60-85 with Diabetes:  Less than 140/90  Age 60-85 without Diabetes:  less than 150/90      Composite cancer screening  Chart review shows that this patient is due/due soon for the following None  Summary:    Patient is due/failing the following:   A1C and BP CHECK    Action needed:   Patient needs office visit for A1C and BP check.    Type of outreach:    Sent letter.    Questions for provider review:    None                                                                                                                                    Lashae Mcguire CMA on 9/27/2018 at 8:43 AM       Chart routed to None .

## 2018-09-28 ENCOUNTER — HOSPITAL ENCOUNTER (OUTPATIENT)
Dept: CT IMAGING | Facility: CLINIC | Age: 43
Discharge: HOME | End: 2018-09-28
Attending: INTERNAL MEDICINE | Admitting: INTERNAL MEDICINE
Payer: COMMERCIAL

## 2018-09-28 DIAGNOSIS — I49.3 PVC'S (PREMATURE VENTRICULAR CONTRACTIONS): ICD-10-CM

## 2018-09-28 DIAGNOSIS — E78.5 HYPERLIPIDEMIA LDL GOAL <70: ICD-10-CM

## 2018-09-28 PROBLEM — E11.9 TYPE 2 DIABETES MELLITUS WITHOUT COMPLICATION, WITHOUT LONG-TERM CURRENT USE OF INSULIN (H): Status: ACTIVE | Noted: 2018-09-28

## 2018-09-28 PROCEDURE — 75571 CT HRT W/O DYE W/CA TEST: CPT | Mod: 26 | Performed by: INTERNAL MEDICINE

## 2018-09-28 PROCEDURE — 75571 CT HRT W/O DYE W/CA TEST: CPT | Mod: GA

## 2018-10-25 ENCOUNTER — TELEPHONE (OUTPATIENT)
Dept: CARDIOLOGY | Facility: CLINIC | Age: 43
End: 2018-10-25

## 2018-10-25 NOTE — TELEPHONE ENCOUNTER
Result note reviewed with patient.  Patient verbalized understanding. Pt is scheduled for 10/31 Annie Acuña RN  Notes Recorded by Manny Subramanian MD on 10/24/2018 at 5:30 PM  Max Yuen. You have frequent PVCs in your monitor. I would like to discuss this during a clinic visit with you. [place] [facility] will call you to schedule an appointment at your convenience.

## 2018-10-31 ENCOUNTER — OFFICE VISIT (OUTPATIENT)
Dept: CARDIOLOGY | Facility: CLINIC | Age: 43
End: 2018-10-31
Payer: COMMERCIAL

## 2018-10-31 VITALS — SYSTOLIC BLOOD PRESSURE: 139 MMHG | OXYGEN SATURATION: 97 % | DIASTOLIC BLOOD PRESSURE: 90 MMHG | HEART RATE: 66 BPM

## 2018-10-31 DIAGNOSIS — I10 BENIGN ESSENTIAL HYPERTENSION: ICD-10-CM

## 2018-10-31 DIAGNOSIS — I49.3 PVC'S (PREMATURE VENTRICULAR CONTRACTIONS): Primary | ICD-10-CM

## 2018-10-31 PROCEDURE — 99214 OFFICE O/P EST MOD 30 MIN: CPT | Performed by: INTERNAL MEDICINE

## 2018-10-31 RX ORDER — AMLODIPINE AND BENAZEPRIL HYDROCHLORIDE 5; 20 MG/1; MG/1
1 CAPSULE ORAL DAILY
Qty: 90 CAPSULE | Refills: 3 | Status: SHIPPED | OUTPATIENT
Start: 2018-10-31 | End: 2018-10-31

## 2018-10-31 RX ORDER — AMLODIPINE AND BENAZEPRIL HYDROCHLORIDE 10; 20 MG/1; MG/1
1 CAPSULE ORAL DAILY
Qty: 90 CAPSULE | Refills: 3 | Status: SHIPPED | OUTPATIENT
Start: 2018-10-31 | End: 2018-11-27

## 2018-10-31 NOTE — NURSING NOTE
"Chief Complaint   Patient presents with     RECHECK     Discuss zio results. Pt reports palpitations.        Initial /90 (BP Location: Left arm, Patient Position: Chair, Cuff Size: Adult Large)  Pulse 66  SpO2 97% Estimated body mass index is 35.18 kg/(m^2) as calculated from the following:    Height as of 5/1/18: 1.77 m (5' 9.69\").    Weight as of 9/11/18: 110.2 kg (243 lb)..  BP completed using cuff size: de Turcios MA    "

## 2018-10-31 NOTE — MR AVS SNAPSHOT
After Visit Summary   10/31/2018    Wilfrid Vickers    MRN: [id number]           Patient Information     Date Of Birth          1975        Visit Information        Provider Department      10/31/2018 12:00 PM Manny Subramanian MD [facility] PHYSICIANS HEART AT [facility]        Today's Diagnoses     PVC's (premature ventricular contractions)    -  1    Benign essential hypertension          Care Instructions    Thank you for coming to the [facility] Heart @ [facility]; please note the following instructions:    1. Increase your Amlodipine from 10mg to 20mg daily. A new prescription has been sent to your pharmacy.     2. Echocardiogram scheduled. See attached for date and time.    3. Dr. Manny Subramanian would like you to return for a cardiac follow up in 1 year  (November).  We will contact you regarding your appointment when the time draws closer or you may call [phone] to arrange an appointment.  Mean while, if you should have any questions or concerns regarding your heart health, please contact us.  Thank you for choosing [facility] for your care.          If you have any questions regarding your visit please contact your care team:     Cardiology  Telephone Number   Annie SEGURA, GLORIA TINSLEY, RN   [place] RENTERIA, AARTI ZAPATA MA   [phone]    *After hours: [phone]   For scheduling appts:     [phone] or    [phone] (select option 1)    *After hours: [phone]     For the Device Clinic (Pacemakers and ICD's)  RN's :  Mckayla Branch   During business hours: [phone]    *After business hours:  [phone] (select option 4)      Normal test result notifications will be released via [employer] or mailed within 7 business days.  All other test results, will be communicated via telephone once reviewed by your cardiologist.    If you need a medication refill please contact your pharmacy.  Please allow 3 business days for your  refill to be completed.    As always, thank you for trusting us with your health care needs!            Follow-ups after your visit        Additional Services     Follow-Up with Cardiologist                 Your next 10 appointments already scheduled     Nov 08, 2018  2:00 PM CST   Ech Complete with [id number]   [facility] [place] ([facility])    [address] [place] [id number]   [phone]           1.  Please bring or wear a comfortable two-piece outfit. 2.  You may eat, drink and take your normal medicines. 3.  For any questions that cannot be answered, please contact the ordering physician 4.  Please do not wear perfumes or scented lotions on the day of your exam.              Future tests that were ordered for you today     Open Future Orders        Priority Expected Expires Ordered    Follow-Up with Cardiologist Routine 10/31/2019 11/1/2019 10/31/2018    Echocardiogram Routine 10/31/2018 10/31/2019 10/31/2018            Who to contact     If you have questions or need follow up information about today's clinic visit or your schedule please contact [facility] PHYSICIANS HEART AT [facility] directly at [phone].  Normal or non-critical lab and imaging results will be communicated to you by MyChart, letter or phone within 4 business days after the clinic has received the results. If you do not hear from us within 7 days, please contact the clinic through MyChart or phone. If you have a critical or abnormal lab result, we will notify you by phone as soon as possible.  Submit refill requests through [employer] or call your pharmacy and they will forward the refill request to us. Please allow 3 business days for your refill to be completed.          Additional Information About Your Visit        [employer] Information     [employer] gives you secure access to your electronic health record. If you see a primary care provider, you can also send  messages to your care team and make appointments. If you have questions, please call your primary care clinic.  If you do not have a primary care provider, please call [phone] and they will assist you.        Care EveryWhere ID     This is your Care EveryWhere ID. This could be used by other organizations to access your [place] medical records  [id number]        Your Vitals Were     Pulse Pulse Oximetry                66 97%           Blood Pressure from Last 3 Encounters:   10/31/18 139/90   09/11/18 (!) 139/97   05/01/18 120/80    Weight from Last 3 Encounters:   09/11/18 110.2 kg (243 lb)   05/01/18 106.6 kg (235 lb)   04/19/18 110.1 kg (242 lb 12.8 oz)                 Today's Medication Changes          These changes are accurate as of 10/31/18  1:05 PM.  If you have any questions, ask your nurse or doctor.               Start taking these medicines.        Dose/Directions    amLODIPine-benazepril 10-20 MG per capsule   Commonly known as:  LOTREL   Used for:  Benign essential hypertension   Replaces:  amLODIPine-benazepril 5-20 MG per capsule   Started by:  Manny Subramanian MD        Dose:  1 capsule   Take 1 capsule by mouth daily   Quantity:  90 capsule   Refills:  3         Stop taking these medicines if you haven't already. Please contact your care team if you have questions.     amLODIPine-benazepril 5-20 MG per capsule   Commonly known as:  LOTREL   Replaced by:  amLODIPine-benazepril 10-20 MG per capsule   Stopped by:  Manny Subramanian MD                Where to get your medicines      These medications were sent to [facility] [address] [facility] [address] AT [facility] [address]  [address], [facility] [address]     Phone:  [phone]     amLODIPine-benazepril 10-20 MG per capsule                Primary Care Provider Office Phone # Fax #    Guzman Walter [phone] [address] [facility] [facility] [address]        Equal  Access to Services     [facility]: Hadii dayna rodgers slava Black, waaxda luqadaha, qaybta kaalnico jovanymarielafrancisco, ro jose alejandro mccannharriettnick winn. So [facility] [phone].    ATENCIÓN: Si habla español, tiene a slade disposición servicios gratuitos de asistencia lingüística. Llame al [phone].    We comply with applicable federal civil rights laws and [place] laws. We do not discriminate on the basis of race, color, national origin, age, disability, sex, sexual orientation, or gender identity.            Thank you!     Thank you for choosing [facility] PHYSICIANS HEART AT [facility]  for your care. Our goal is always to provide you with excellent care. Hearing back from our patients is one way we can continue to improve our services. Please take a few minutes to complete the written survey that you may receive in the mail after your visit with us. Thank you!             Your Updated Medication List - Protect others around you: Learn how to safely use, store and throw away your medicines at www.disposemymeds.org.          This list is accurate as of 10/31/18  1:05 PM.  Always use your most recent med list.                   Brand Name Dispense Instructions for use Diagnosis    ALPRAZolam 0.5 MG tablet    XANAX    20 tablet    Take 1 tablet (0.5 mg) by mouth 3 times daily as needed for anxiety (As needed 15mins prior to take-off)    EILEEN (generalized anxiety disorder), Fear of flying       amLODIPine-benazepril 10-20 MG per capsule    LOTREL    90 capsule    Take 1 capsule by mouth daily    Benign essential hypertension       atorvastatin 10 MG tablet    LIPITOR    90 tablet    Take 1 tablet (10 mg) by mouth daily    Hyperlipidemia with target LDL less than 70       * azithromycin 250 MG tablet    ZITHROMAX    6 tablet    Two tablets first day, then one tablet daily for four days.    Pneumonia due to infectious organism, unspecified laterality, unspecified part of lung       * azithromycin 250  MG tablet    ZITHROMAX    4 tablet    Take 1 tablet daily to extend treatment.    Community acquired pneumonia, unspecified laterality       * Notice:  This list has 2 medication(s) that are the same as other medications prescribed for you. Read the directions carefully, and ask your doctor or other care provider to review them with you.

## 2018-10-31 NOTE — PROGRESS NOTES
Referring provider: Guzman Walter MD  Chief complaint: palpitations and high blood pressure    HPI: Mr. Wilfrid Vickers is a 42 year old  male with PMH significant for PVCs and hypertension.     has been lately feeling foggy in his head and lightheaded and have been measuring his BP higher than normal. He thinks his BP has been slowly creeping up over the last 3 years and during the last 6 months it has been higher than before. He has been to the ED on 2/3/2018 because he was feeling headache and his BP was 200/125 mmHg. He was not on any medication at that point. When he was in the ED his BP was 174/118 mmHg. Labetolol 10 mg IV was given and his BP came down to 135/98 mmHg and he was sent home with 25 mg of metoprolol. His head CT was normal. He started taking that since then however his BP yesterday was 161/110 mmHg in the morning and today he measured it 151/105 mmHg.     He has been feeling palpitations during the last 1.5 years and has been diagnosed with PVCs when he was [place]. He was on metoprolol for some time but did not benefit from that and stopped one month ago. He feels palpitations as skipped beats mostly aggrevated by caffeine and beer and he stopped drinking those. He exercises regularly and working to lose some weight. He feels very well during exercise, however after he stops exercising he starts feeling more skipped beats. He states he suddenly feels the onset of skipped beats. The frequency of the episodes varies, last week he felt skipped beats on 2 days of the week.     He denies exertional CP, FELIX, LE edema, palpitations, PND, ortopnea or syncope. His HbA1c is 6.9 on 2/3/2018, but the patient refuses to get further treatment for that. He states the test was done without his consent. He states if he loses weight he will decrease his HbA1c. His LDL is 216 mg/dl and he again wants to exercise and lose weight and doesnot want to be on statin for now.He doesnot  have any other prior cardiac history. He is a non-smoker. No FH of CAD or SCD    His echocardiogram from 2016 shows a structurally normal heart. I have also reviewed his ECG from 2/3/2018 which shows normal sinus rhythm, no premature beats.    Medications, personal, family, and social history reviewed with patient and revised.    Interval follow-up 9/11/2018:  The patient is here for six-month follow-up.  He still feels premature beats (likely PVCs based on a previous ECG ) almost every day sometimes lasting a few hours.  He reports having more PVCs particularly after eating, drinking alcohol or coffee, smoking marijuana. He denies worsening palpitations with activity, associated lightheaded or dizzy feeling.  He feels anxious with these skipped beats.  The patient reports feeling mildly dizzy almost every day sitting or standing.  He correlates more dizziness with high carbohydrate meals.  No syncope.  He has atypical chest pain unrelated to exertion only at one spot on the right side of his chest.  He denies dyspnea on exertion.  He walks every day 45 minutes with his dog.    Interval follow-up 10/31/2018:  The patient is here to discuss his most recent zio patch results which showed frequent PVCs burden 6.6%.  There is a dominant PVC morphology that corresponded to patient's symptoms.  The patient reports 3 major triggers for frequent PVCs; coffee, alcohol and marijuana.  The patient has completely eliminated coffee and has stopped drinking which has significantly improved the frequency of PVCs so far.  I did discuss with him the options of treatment mainly being antiarrhythmics and catheter ablation.  The patient is not interested in either undergoing medication or procedure.  He continues to feel PVCs from from time to time but he does not seem to be bothered too much since the frequency has decreased after he made lifestyle changes.  He recently underwent coronary CT calcium which showed score is 0.   Echocardiogram from 2016 showed EF of 52% with normal LV size.  The patient reports blood pressure at home systolic 130/135 and diastolic 90/95 mmHg.  Patient blood pressure is mildly high in clinic today at 139/90 mmHg.    PAST MEDICAL HISTORY:  Hypertension  PVCs  Diabetes  HL    CURRENT MEDICATIONS:  Amlodipine benazepril 5/20 mg  Melatonin  Xanax as needed.    PAST SURGICAL HISTORY:  Past Surgical History:   Procedure Laterality Date     ARTHROPLASTY SHOULDER Left     torn labrum, rotator cuff repair       ALLERGIES:     Allergies   Allergen Reactions     Amoxicillin      Rash     Erythromycin      Vomiting       FAMILY HISTORY:  Family History   Problem Relation Age of Onset     Cancer Father      Prostate     Allergies Mother          SOCIAL HISTORY:  Social History   Substance Use Topics     Smoking status: Never Smoker     Smokeless tobacco: Never Used     Alcohol use Yes      Comment: 6 pack per weekend twice monthly       ROS:   Constitutional: No fever, chills, or sweats. Weight stable.   ENT: No visual disturbance, ear ache, epistaxis, sore throat.   Cardiovascular: As per HPI.   Respiratory:Dry cough+, hemoptysis.    GI: No nausea, vomiting, hematemesis, melena, or hematochezia.   : No hematuria.   Integument: Negative.   Hematologic: No easy bruising, no easy bleeding.  Neuro: Negative.   Endocrinology: No significant heat or cold intolerance   Musculoskeletal: No myalgia.    Exam:  /90 (BP Location: Left arm, Patient Position: Chair, Cuff Size: Adult Large)  Pulse 66  SpO2 97%  GENERAL APPEARANCE: alert and no distress  HEENT: no icterus, no central cyanosis  LYMPH/NECK: JVP not elevated  RESPIRATORY: lungs clear to auscultation - no rales, rhonchi or wheezes, no use of accessory muscles, no retractions, respirations are unlabored, normal respiratory rate  CARDIOVASCULAR: regular rhythm, normal S1, S2, no S3 or S4 and no murmur, click or rub, precordium quiet with normal PMI.  GI: soft, non  tender  EXTREMITIES: no edema    I have reviewed the labs and personally reviewed the imaging below (zio patch recording) and made my comment in the assessment and plan.    Labs:  CBC RESULTS:   Lab Results   Component Value Date    WBC 9.4 02/03/2018    RBC 5.03 02/03/2018    HGB 16.0 02/03/2018    HCT 47.7 02/03/2018    MCV 95 02/03/2018    MCH 31.8 02/03/2018    MCHC 33.5 02/03/2018    RDW 12.3 02/03/2018     02/03/2018       BMP RESULTS:  Lab Results   Component Value Date     03/28/2018    POTASSIUM 4.0 03/28/2018    CHLORIDE 105 03/28/2018    CO2 24 03/28/2018    ANIONGAP 11 03/28/2018     (H) 03/28/2018    BUN 16 03/28/2018    CR 1.05 03/28/2018    GFRESTIMATED 78 03/28/2018    GFRESTBLACK >90 03/28/2018    HEMANT 9.7 03/28/2018   Zio patch 9/26/2018: 6.6% frequent PVCs mainly one dominant morphology.    Transthoracic Echocardiogram 9/16/2016  Left ventricular size is normal.  The Ejection Fraction is estimated at 50-55%.  Right ventricular function, chamber size, wall motion, and thickness are  normal.  Pulmonary artery systolic pressure cannot be assessed.  No pericardial effusion is present.  No significant valvular dysfunction noted.    EKG 2/3/2018 normal    Assessment and Plan:   Mr. Wilfrid Vickers is a 42 year old  male with PMH significant for PVCs and hypertension. His physical exam is normal.     1. Systemic hypertension: Blood pressure slightly elevated today in clinic.  The patient also reports mild elevated blood pressure at home.  I recommended to increase amlodipine benazepril dose to 10-20 mg.    2. PVCs: He feels them everyday. No syncope. No underlying structural heart disease. His CBC, electrolyes (Na, K) and TSH are all normal. Normal kidney function.  I personally reviewed the zio patch recording which showed frequent PVCs with one dominant morphology. His symptoms correspond to PVCs. Recently the patient has made lifestyle changes like eliminating coffee and alcohol  which has significantly improved his symptoms.  The patient is not interested in antiarrhythmics or ablation at the moment.  I will recheck his LV function which was 52% in 2016.    3.  Diabetes type 2: HbA1c: Measured 6.9 on 2/3/2019. He doesnot want any further investigation or treatment for DM.    4. Hyperlipidemia: Most recent LDL from 9/26/2018 is elevated at 177 mg/dL.  I have recommended starting atorvastatin 10 mg.  The patient has not started yet.  Coronary calcium score is 0.    Medication changes today: Amlodipine benazepril dose increased to 10-20 mg.  Return to clinic in 1 year.    Tests ordered: TTE    It was my absolute pleasure to meet  in the office today. Please donot hesitate to contact me if you have any questions or concerns.    Manny CARTER MD  [facility] Division of Cardiology  Pager [phone]

## 2018-10-31 NOTE — PATIENT INSTRUCTIONS
Thank you for coming to the [facility] Heart @ [facility]; please note the following instructions:    1. Increase your Amlodipine from 10mg to 20mg daily. A new prescription has been sent to your pharmacy.     2. Echocardiogram scheduled. See attached for date and time.    3. Dr. Bryant Can would like you to return for a cardiac follow up in 1 year  (November).  We will contact you regarding your appointment when the time draws closer or you may call [phone] to arrange an appointment.  Mean while, if you should have any questions or concerns regarding your heart health, please contact us.  Thank you for choosing [facility] for your care.          If you have any questions regarding your visit please contact your care team:     Cardiology  Telephone Number   Annie SEGURA, GLORIA TINSLEY, GLORIA RENTERIA, AARTI ZAPATA MA   [phone]    *After hours: [phone]   For scheduling appts:     [phone] or    [phone] (select option 1)    *After hours: [phone]     For the Device Clinic (Pacemakers and ICD's)  RN's :  Mckayla Branch   During business hours: [phone]    *After business hours:  [phone] (select option 4)      Normal test result notifications will be released via [employer] or mailed within 7 business days.  All other test results, will be communicated via telephone once reviewed by your cardiologist.    If you need a medication refill please contact your pharmacy.  Please allow 3 business days for your refill to be completed.    As always, thank you for trusting us with your health care needs!

## 2018-10-31 NOTE — LETTER
10/31/2018      RE: Wilfrid Vickers  [address] [address]       Dear Colleague,    Thank you for the opportunity to participate in the care of your patient, Wilfrid Vickers, at the [facility] AT [facility] at [facility]. Please see a copy of my visit note below.    Referring provider: Guzman Walter MD  Chief complaint: palpitations and high blood pressure    HPI: Mr. Wilfrid Vickers is a 42 year old  male with PMH significant for PVCs and hypertension.     has been lately feeling foggy in his head and lightheaded and have been measuring his BP higher than normal. He thinks his BP has been slowly creeping up over the last 3 years and during the last 6 months it has been higher than before. He has been to the ED on 2/3/2018 because he was feeling headache and his BP was 200/125 mmHg. He was not on any medication at that point. When he was in the ED his BP was 174/118 mmHg. Labetolol 10 mg IV was given and his BP came down to 135/98 mmHg and he was sent home with 25 mg of metoprolol. His head CT was normal. He started taking that since then however his BP yesterday was 161/110 mmHg in the morning and today he measured it 151/105 mmHg.     He has been feeling palpitations during the last 1.5 years and has been diagnosed with PVCs when he was [place]. He was on metoprolol for some time but did not benefit from that and stopped one month ago. He feels palpitations as skipped beats mostly aggrevated by caffeine and beer and he stopped drinking those. He exercises regularly and working to lose some weight. He feels very well during exercise, however after he stops exercising he starts feeling more skipped beats. He states he suddenly feels the onset of skipped beats. The frequency of the episodes varies, last week he felt skipped beats on 2 days of the week.     He denies exertional CP,  FELIX, LE edema, palpitations, PND, ortopnea or syncope. His HbA1c is 6.9 on 2/3/2018, but the patient refuses to get further treatment for that. He states the test was done without his consent. He states if he loses weight he will decrease his HbA1c. His LDL is 216 mg/dl and he again wants to exercise and lose weight and doesnot want to be on statin for now.He doesnot have any other prior cardiac history. He is a non-smoker. No FH of CAD or SCD    His echocardiogram from 2016 shows a structurally normal heart. I have also reviewed his ECG from 2/3/2018 which shows normal sinus rhythm, no premature beats.    Medications, personal, family, and social history reviewed with patient and revised.    Interval follow-up 9/11/2018:  The patient is here for six-month follow-up.  He still feels premature beats (likely PVCs based on a previous ECG ) almost every day sometimes lasting a few hours.  He reports having more PVCs particularly after eating, drinking alcohol or coffee, smoking marijuana. He denies worsening palpitations with activity, associated lightheaded or dizzy feeling.  He feels anxious with these skipped beats.  The patient reports feeling mildly dizzy almost every day sitting or standing.  He correlates more dizziness with high carbohydrate meals.  No syncope.  He has atypical chest pain unrelated to exertion only at one spot on the right side of his chest.  He denies dyspnea on exertion.  He walks every day 45 minutes with his dog.    Interval follow-up 10/31/2018:  The patient is here to discuss his most recent zio patch results which showed frequent PVCs burden 6.6%.  There is a dominant PVC morphology that corresponded to patient's symptoms.  The patient reports 3 major triggers for frequent PVCs; coffee, alcohol and marijuana.  The patient has completely eliminated coffee and has stopped drinking which has significantly improved the frequency of PVCs so far.  I did discuss with him the options of treatment  mainly being antiarrhythmics and catheter ablation.  The patient is not interested in either undergoing medication or procedure.  He continues to feel PVCs from from time to time but he does not seem to be bothered too much since the frequency has decreased after he made lifestyle changes.  He recently underwent coronary CT calcium which showed score is 0.  Echocardiogram from 2016 showed EF of 52% with normal LV size.  The patient reports blood pressure at home systolic 130/135 and diastolic 90/95 mmHg.  Patient blood pressure is mildly high in clinic today at 139/90 mmHg.    PAST MEDICAL HISTORY:  Hypertension  PVCs  Diabetes  HL    CURRENT MEDICATIONS:  Amlodipine benazepril 5/20 mg  Melatonin  Xanax as needed.    PAST SURGICAL HISTORY:  Past Surgical History:   Procedure Laterality Date     ARTHROPLASTY SHOULDER Left     torn labrum, rotator cuff repair       ALLERGIES:     Allergies   Allergen Reactions     Amoxicillin      Rash     Erythromycin      Vomiting       FAMILY HISTORY:  Family History   Problem Relation Age of Onset     Cancer Father      Prostate     Allergies Mother          SOCIAL HISTORY:  Social History   Substance Use Topics     Smoking status: Never Smoker     Smokeless tobacco: Never Used     Alcohol use Yes      Comment: 6 pack per weekend twice monthly       ROS:   Constitutional: No fever, chills, or sweats. Weight stable.   ENT: No visual disturbance, ear ache, epistaxis, sore throat.   Cardiovascular: As per HPI.   Respiratory:Dry cough+, hemoptysis.    GI: No nausea, vomiting, hematemesis, melena, or hematochezia.   : No hematuria.   Integument: Negative.   Hematologic: No easy bruising, no easy bleeding.  Neuro: Negative.   Endocrinology: No significant heat or cold intolerance   Musculoskeletal: No myalgia.    Exam:  /90 (BP Location: Left arm, Patient Position: Chair, Cuff Size: Adult Large)  Pulse 66  SpO2 97%  GENERAL APPEARANCE: alert and no distress  HEENT: no icterus,  no central cyanosis  LYMPH/NECK: JVP not elevated  RESPIRATORY: lungs clear to auscultation - no rales, rhonchi or wheezes, no use of accessory muscles, no retractions, respirations are unlabored, normal respiratory rate  CARDIOVASCULAR: regular rhythm, normal S1, S2, no S3 or S4 and no murmur, click or rub, precordium quiet with normal PMI.  GI: soft, non tender  EXTREMITIES: no edema    I have reviewed the labs and personally reviewed the imaging below (zio patch recording) and made my comment in the assessment and plan.    Labs:  CBC RESULTS:   Lab Results   Component Value Date    WBC 9.4 02/03/2018    RBC 5.03 02/03/2018    HGB 16.0 02/03/2018    HCT 47.7 02/03/2018    MCV 95 02/03/2018    MCH 31.8 02/03/2018    MCHC 33.5 02/03/2018    RDW 12.3 02/03/2018     02/03/2018       BMP RESULTS:  Lab Results   Component Value Date     03/28/2018    POTASSIUM 4.0 03/28/2018    CHLORIDE 105 03/28/2018    CO2 24 03/28/2018    ANIONGAP 11 03/28/2018     (H) 03/28/2018    BUN 16 03/28/2018    CR 1.05 03/28/2018    GFRESTIMATED 78 03/28/2018    GFRESTBLACK >90 03/28/2018    HEMANT 9.7 03/28/2018   Zio patch 9/26/2018: 6.6% frequent PVCs mainly one dominant morphology.    Transthoracic Echocardiogram 9/16/2016  Left ventricular size is normal.  The Ejection Fraction is estimated at 50-55%.  Right ventricular function, chamber size, wall motion, and thickness are  normal.  Pulmonary artery systolic pressure cannot be assessed.  No pericardial effusion is present.  No significant valvular dysfunction noted.    EKG 2/3/2018 normal    Assessment and Plan:   Mr. Wilfrid Vickers is a 42 year old  male with PMH significant for PVCs and hypertension. His physical exam is normal.     1. Systemic hypertension: Blood pressure slightly elevated today in clinic.  The patient also reports mild elevated blood pressure at home.  I recommended to increase amlodipine benazepril dose to 10-20 mg.    2. PVCs: He feels them  everyday. No syncope. No underlying structural heart disease. His CBC, electrolyes (Na, K) and TSH are all normal. Normal kidney function.  I personally reviewed the zio patch recording which showed frequent PVCs with one dominant morphology. His symptoms correspond to PVCs. Recently the patient has made lifestyle changes like eliminating coffee and alcohol which has significantly improved his symptoms.  The patient is not interested in antiarrhythmics or ablation at the moment.  I will recheck his LV function which was 52% in 2016.    3.  Diabetes type 2: HbA1c: Measured 6.9 on 2/3/2019. He doesnot want any further investigation or treatment for DM.    4. Hyperlipidemia: Most recent LDL from 9/26/2018 is elevated at 177 mg/dL.  I have recommended starting atorvastatin 10 mg.  The patient has not started yet.  Coronary calcium score is 0.    Medication changes today: Amlodipine benazepril dose increased to 10-20 mg.  Return to clinic in 1 year.    Tests ordered: TTE    It was my absolute pleasure to meet  in the office today. Please donot hesitate to contact me if you have any questions or concerns.    Manny CARTER MD  [facility] Division of Cardiology  Pager [phone]

## 2018-11-27 DIAGNOSIS — I10 BENIGN ESSENTIAL HYPERTENSION: ICD-10-CM

## 2018-11-27 RX ORDER — AMLODIPINE AND BENAZEPRIL HYDROCHLORIDE 10; 20 MG/1; MG/1
1 CAPSULE ORAL DAILY
Qty: 90 CAPSULE | Refills: 3 | Status: SHIPPED | OUTPATIENT
Start: 2018-11-27 | End: 2019-07-09 | Stop reason: DRUGHIGH

## 2018-11-27 NOTE — TELEPHONE ENCOUNTER
Pending Prescriptions:                       Disp   Refills    amLODIPine-benazepril (LOTREL) 10-20 MG p*90 cap*3            Sig: Take 1 capsule by mouth daily    Last Visit 10/31/18 w/Can  Last Filled ?  Quantity 90  Req. Received 11/27/18  AARTI Bustamante

## 2019-04-08 ENCOUNTER — TELEPHONE (OUTPATIENT)
Dept: FAMILY MEDICINE | Facility: CLINIC | Age: 44
End: 2019-04-08

## 2019-04-08 NOTE — TELEPHONE ENCOUNTER
Wilfrid Vickers is a 43 year old male who calls with blood pressure readings of 158/84, 138/84, and 119/70.  Patient has history of PVCs (with no underlying structural heart disease) and HTN.  Patient states he had only been sitting for 2-3 minutes when he took all three blood pressures in a row.  Patient states his BP has never been as low as 119/70 and was feeling slightly lightheaded.      Patient reports he has a history of daily PVCs.  However, his HTN meds have been helping somewhat.  Now his PVCs have increased again x 3-4 days.  Reviewed cardiologist's note from 10/31/18.    Denies chest pain, headache, blurred vision, nausea/vomiting, difficulty breathing, weakness, drowsiness, or coughing up blood.      Instructed patient to make a follow-up appointment with cardiologist due to increase in PVCs x 3-4 days.  Reviewed symptoms when to call back or when to seek emergency care.       Patient agreed to this plan.    Nick Villanueva RN

## 2019-04-08 NOTE — TELEPHONE ENCOUNTER
Reason for call:  Patient reporting a symptom    Symptom or request: heart rate dropping and raising    Duration (how long have symptoms been present): today     Have you been treated for this before? Yes    Additional comments: has been going on for awhile, but never dropped so low    Phone Number patient can be reached at:  Home number on file [phone] (home)    Best Time:  anytime    Can we leave a detailed message on this number:  YES    Call taken on 4/8/2019 at 2:02 PM by Michelle Dwyer

## 2019-04-13 ENCOUNTER — OFFICE VISIT (OUTPATIENT)
Dept: URGENT CARE | Facility: URGENT CARE | Age: 44
End: 2019-04-13
Payer: COMMERCIAL

## 2019-04-13 ENCOUNTER — NURSE TRIAGE (OUTPATIENT)
Dept: NURSING | Facility: CLINIC | Age: 44
End: 2019-04-13

## 2019-04-13 VITALS
HEART RATE: 68 BPM | SYSTOLIC BLOOD PRESSURE: 117 MMHG | OXYGEN SATURATION: 98 % | TEMPERATURE: 98.2 F | BODY MASS INDEX: 35.18 KG/M2 | DIASTOLIC BLOOD PRESSURE: 80 MMHG | WEIGHT: 243 LBS

## 2019-04-13 DIAGNOSIS — R42 DIZZINESS: Primary | ICD-10-CM

## 2019-04-13 DIAGNOSIS — E11.9 TYPE 2 DIABETES MELLITUS WITHOUT COMPLICATION, WITHOUT LONG-TERM CURRENT USE OF INSULIN (H): ICD-10-CM

## 2019-04-13 LAB — HBA1C MFR BLD: 9.4 % (ref 0–5.6)

## 2019-04-13 PROCEDURE — 83036 HEMOGLOBIN GLYCOSYLATED A1C: CPT | Performed by: FAMILY MEDICINE

## 2019-04-13 PROCEDURE — 99214 OFFICE O/P EST MOD 30 MIN: CPT | Performed by: FAMILY MEDICINE

## 2019-04-13 PROCEDURE — 36415 COLL VENOUS BLD VENIPUNCTURE: CPT | Performed by: FAMILY MEDICINE

## 2019-04-13 RX ORDER — METFORMIN HCL 500 MG
2000 TABLET, EXTENDED RELEASE 24 HR ORAL
Qty: 360 TABLET | Refills: 0 | Status: SHIPPED | OUTPATIENT
Start: 2019-04-13 | End: 2019-05-13

## 2019-04-13 NOTE — PATIENT INSTRUCTIONS
Make appointment to see your primary care doctor in 3 months    Metformin 500mg once a day for a week  Metformin 1000mg once a day for a week in week 2  Metformin 1500mg once a day for a week in week 3   Week 4 and going forward take 2000mg a day       If you are feeling dizzy take your blood sugar and record this in a log. If feeling shaky, have low sugar level less than 80 then eat a piece of bread or drink apple/orange juice

## 2019-04-13 NOTE — PROGRESS NOTES
Subjective:   Wilfrid Vickers is a 43 year old male who presents for   Chief Complaint   Patient presents with     Urgent Care     Dizziness     c/o dizzy for 2 weeks off and on     Denies room spinning. Patient discusses he is aware of his weight and has been trying to be more conscious of this. Is diabetic last a1c was 6.9 a year ago - blood sugars at home in the morning before eating has been in the high 100's (190 this morning) .   no recorded fevers. Denies double vision. Denies hearing difficulties such as diminished hearing, ear ringing. Episodes feel like the moments where you stand up to quickly from laying down. Reports last exam was a year ago.     takes blood pressure medications. BP readings at home have typically been in the 130's-90's but is wondering why readings in the office are lower than at home. He denies any headaches.     Has appointment with his primary at [facility] being set up.     Patient Active Problem List    Diagnosis Date Noted     Type 2 diabetes mellitus without complication, without long-term current use of insulin (H) 09/28/2018     Priority: Medium     Anxiety disorder, unspecified type 02/14/2018     Priority: Medium     Benign essential hypertension 11/28/2017     Priority: Medium     PVC's (premature ventricular contractions) 11/28/2017     Priority: Medium     Elevated blood pressure reading without diagnosis of hypertension 07/17/2017     Priority: Medium     Phobia, flying 05/04/2016     Priority: Medium     CARDIOVASCULAR SCREENING; LDL GOAL LESS THAN 160 04/23/2013     Priority: Medium     Labral tear of shoulder 04/23/2013     Priority: Medium     Recurrent shoulder dislocation 04/23/2013     Priority: Medium       Current Outpatient Medications   Medication     ALPRAZolam (XANAX) 0.5 MG tablet     amLODIPine-benazepril (LOTREL) 10-20 MG per capsule     atorvastatin (LIPITOR) 10 MG tablet     metFORMIN (GLUCOPHAGE-XR) 500 MG 24 hr tablet     azithromycin  (ZITHROMAX) 250 MG tablet     azithromycin (ZITHROMAX) 250 MG tablet     No current facility-administered medications for this visit.      ROS:  As above per HPI    Objective:   /80   Pulse 68   Temp 98.2  F (36.8  C) (Tympanic)   Wt 110.2 kg (243 lb)   SpO2 98%   BMI 35.18 kg/m  , Body mass index is 35.18 kg/m .  Gen:  NAD, well-nourished, sitting in chair comfortably  HEENT: EOMI, sclera anicteric, Head normocephalic, ; nares patent; moist mucous membranes  Neck: trachea midline, no thyromegaly  CV:  Hemodynamically stable, RRR  Pulm:  no increased work of breathing , CTAB, no wheezes/rales/rhonchi   Extrem: no cyanosis, edema or clubbing  Skin: no obvious rashes or abnormalities  Psych: Euthymic, linear thoughts, normal rate of speech    Assessment & Plan:   Wilfrid Vickers, 43 year old male who presents with:    Dizziness  Type 2 diabetes mellitus without complication, without long-term current use of insulin (H)  No acute illnesses at this time. Patient started doing intermittent fasting in the last month -- I've asked he record blood sugars when the dizziness symptoms come on and record in log. If low, should eat bread/crackers/drink orange or apple juice. Given his elevated sugars in the 180's in the mornings he was open to starting therapy for his diabetes, we'll do a 3 week titration of metformin extended release and check his A1c today as a baseline. He is motivated and wants to lose weight and increase physical activitiy. If dizziness persists or gets worse he should be seen right away. His balance was normal today and eye exam was without abnormalities. Fortunately, no room spinning. Good oral hydration encouraged - did not appear dehydrated today. I doubt his symptoms are a result of his Lotrel given his reported home BP readings.   - metFORMIN (GLUCOPHAGE-XR) 500 MG 24 hr tablet  Dispense: 360 tablet; Refill: 0  - Hemoglobin A1c      Dylan Sandoval MD   [facility]    The  use of Dragon/iSTARation services may have been used to construct the content in this note; any grammatical or spelling errors are non-intentional. Please contact the author of this note directly if you are in need of any clarification.

## 2019-04-13 NOTE — TELEPHONE ENCOUNTER
Patient calling reporting he has been checking his blood sugars at home and they have been elevated. Reporting they range 180-200 after fasting. Current reading 199 fasting.   Reporting he has felt dizzy x 2 years.     Per guidelines advised to see provider with in 24 hours. Caller verbalized understanding. Denies further questions.    Montse Berry RN  [facility]          Reason for Disposition    New onset Diabetes suspected (e.g., frequent urination, weakness, weight loss)    Additional Information    Negative: Unconscious or difficult to awaken    Negative: Acting confused (e.g., disoriented, slurred speech)    Negative: Very weak (e.g., can't stand)    Negative: Sounds like a life-threatening emergency to the triager    Negative: [1] Vomiting AND [2] signs of dehydration (e.g., very dry mouth, lightheaded, etc.)    Negative: [1] Blood glucose > 240 mg/dl (13 mmol/l) AND [2] rapid breathing    Negative: Blood glucose > 500 mg/dl (27.5 mmol/l)    Negative: [1] Blood glucose > 240 mg/dl (13 mmol/l) AND [2] urine ketones moderate-large (or more than 1+)    Negative: [1] Blood glucose > 240 mg/dl (13 mmol/l) AND [2] blood ketones > 1.5 mmol/l    Negative: [1] Blood glucose > 240 mg/dl (13 mmol/l) AND [2] vomiting AND [3] unable to check for ketones (in blood or urine)    Negative: [1] New onset Diabetes suspected (e.g., frequent urination, weak, weight loss) AND [2] vomiting or rapid breathing    Negative: Vomiting lasts > 4 hours    Negative: Patient sounds very sick or weak to the triager    Negative: Fever > 100.5 F (38.1 C)    Negative: Blood glucose > 400 mg/dl (22 mmol/l)    Negative: [1] Blood glucose > 300 mg/dl (16.5 mmol/l) AND [2] two or more times in a row    Negative: Urine ketones moderate - large    Negative: Caller has URGENT medication or insulin pump question and triager unable to answer question    Negative: [1] Symptoms of high blood sugar (e.g., frequent urination, weak, weight  loss) AND [2] not able to test blood glucose    Protocols used: DIABETES - HIGH BLOOD SUGAR-ADULT-AH

## 2019-04-18 ENCOUNTER — OFFICE VISIT (OUTPATIENT)
Dept: FAMILY MEDICINE | Facility: CLINIC | Age: 44
End: 2019-04-18
Payer: COMMERCIAL

## 2019-04-18 VITALS
DIASTOLIC BLOOD PRESSURE: 76 MMHG | HEART RATE: 73 BPM | OXYGEN SATURATION: 98 % | BODY MASS INDEX: 35.27 KG/M2 | SYSTOLIC BLOOD PRESSURE: 126 MMHG | RESPIRATION RATE: 18 BRPM | TEMPERATURE: 97.9 F | WEIGHT: 243.6 LBS

## 2019-04-18 DIAGNOSIS — E11.9 TYPE 2 DIABETES MELLITUS WITHOUT COMPLICATION, WITHOUT LONG-TERM CURRENT USE OF INSULIN (H): Primary | ICD-10-CM

## 2019-04-18 PROCEDURE — 99214 OFFICE O/P EST MOD 30 MIN: CPT | Performed by: FAMILY MEDICINE

## 2019-04-18 NOTE — PROGRESS NOTES
SUBJECTIVE:   Wilfrid Vickers is a 43 year old male who presents to clinic today for the following   health issues:    Chief Complaint   Patient presents with     Diabetes     Was diagnosed with diabetes and is needing supplies to take sugars at home      Prescriptions for lancets, test strips  Alcohol prep pads.  Since starting medications, symptoms associated with diabetes have resolved, trying to lose weight, low carb diet, regular CV exercise    Additional history: as documented    Reviewed  and updated as needed this visit by clinical staff  Tobacco  Allergies  Meds  Problems  Med Hx  Surg Hx  Fam Hx         Reviewed and updated as needed this visit by Provider  Tobacco  Allergies  Meds  Problems  Med Hx  Surg Hx  Fam Hx         BP Readings from Last 3 Encounters:   04/18/19 126/76   04/13/19 117/80   10/31/18 139/90    Wt Readings from Last 3 Encounters:   04/18/19 110.5 kg (243 lb 9.6 oz)   04/13/19 110.2 kg (243 lb)   09/11/18 110.2 kg (243 lb)            ROS:  Constitutional, HEENT, cardiovascular, pulmonary, gi and gu systems are negative, except as otherwise noted.    OBJECTIVE:     /76   Pulse 73   Temp 97.9  F (36.6  C) (Oral)   Resp 18   Wt 110.5 kg (243 lb 9.6 oz)   SpO2 98%   BMI 35.27 kg/m    Body mass index is 35.27 kg/m .  GENERAL: healthy, alert and no distress  EYES: Eyes grossly normal to inspection, PERRL and conjunctivae and sclerae normal  NECK: no adenopathy, no asymmetry, masses, or scars and thyroid normal to palpation  RESP: lungs clear to auscultation - no rales, rhonchi or wheezes  CV: regular rate and rhythm, normal S1 S2, no S3 or S4, no murmur, click or rub, no peripheral edema and peripheral pulses strong  SKIN: no suspicious lesions or rashes  PSYCH: mentation appears normal, affect normal/bright    ASSESSMENT/PLAN:       ICD-10-CM    1. Type 2 diabetes mellitus without complication, without long-term current use of insulin (H) E11.9 blood glucose  (ONETOUCH VERIO IQ) test strip     blood glucose calibration (ONETOUCH VERIO) solution     blood glucose monitoring (ONE TOUCH DELICA) lancets     DIABETES EDUCATOR REFERRAL     Discussed pathogenesis of diabetes at length, necessary diet and exercise changes, will prescribe equipment for him to check blood glucose, follow-up in 1-3 months to recheck A1C    Follow-up in 1-3 months    Guzman Hammonds MD  [facility]

## 2019-04-25 ENCOUNTER — OFFICE VISIT (OUTPATIENT)
Dept: FAMILY MEDICINE | Facility: CLINIC | Age: 44
End: 2019-04-25
Payer: COMMERCIAL

## 2019-04-25 VITALS
OXYGEN SATURATION: 100 % | BODY MASS INDEX: 34.6 KG/M2 | WEIGHT: 239 LBS | DIASTOLIC BLOOD PRESSURE: 90 MMHG | SYSTOLIC BLOOD PRESSURE: 124 MMHG | RESPIRATION RATE: 20 BRPM | HEART RATE: 93 BPM | TEMPERATURE: 97.7 F

## 2019-04-25 DIAGNOSIS — E11.9 TYPE 2 DIABETES MELLITUS WITHOUT COMPLICATION, WITHOUT LONG-TERM CURRENT USE OF INSULIN (H): ICD-10-CM

## 2019-04-25 DIAGNOSIS — I10 BENIGN ESSENTIAL HYPERTENSION: ICD-10-CM

## 2019-04-25 DIAGNOSIS — F40.243 FEAR OF FLYING: Primary | ICD-10-CM

## 2019-04-25 PROCEDURE — 99213 OFFICE O/P EST LOW 20 MIN: CPT | Performed by: FAMILY MEDICINE

## 2019-04-25 RX ORDER — ALPRAZOLAM 0.25 MG
0.25 TABLET ORAL DAILY PRN
Qty: 20 TABLET | Refills: 0 | Status: SHIPPED | OUTPATIENT
Start: 2019-04-25

## 2019-04-25 NOTE — PROGRESS NOTES
SUBJECTIVE:   Wilfrid Vickers is a 43 year old male who presents to clinic today for the following   health issues:    Chief Complaint   Patient presents with     Diabetes     Brought in log     Recheck Medication     Hypertension     Would like to talk about blood pressure monitor      Patient presents for diabetes follow-up visit - he brought in his glucose log and has been well controlled with oral medications and diet changes.  Fasting range from 90's-low 100's.  Trying to lose weight as well.    Fear of flying - requests xanax refill    HTN - better control, low sodium, regular exercise    Additional history: as documented    Reviewed  and updated as needed this visit by clinical staff  Tobacco  Allergies  Meds  Problems  Med Hx  Surg Hx  Fam Hx         Reviewed and updated as needed this visit by Provider  Tobacco  Allergies  Meds  Problems  Med Hx  Surg Hx  Fam Hx         BP Readings from Last 3 Encounters:   04/25/19 124/90   04/18/19 126/76   04/13/19 117/80    Wt Readings from Last 3 Encounters:   04/25/19 108.4 kg (239 lb)   04/18/19 110.5 kg (243 lb 9.6 oz)   04/13/19 110.2 kg (243 lb)          ROS:  Constitutional, HEENT, cardiovascular, pulmonary, gi and gu systems are negative, except as otherwise noted.    OBJECTIVE:     /90   Pulse 93   Temp 97.7  F (36.5  C) (Oral)   Resp 20   Wt 108.4 kg (239 lb)   SpO2 100%   BMI 34.60 kg/m    Body mass index is 34.6 kg/m .  GENERAL: healthy, alert and no distress  EYES: Eyes grossly normal to inspection, PERRL and conjunctivae and sclerae normal  HENT: ear canals and TM's normal, nose and mouth without ulcers or lesions  NECK: no adenopathy, no asymmetry, masses, or scars and thyroid normal to palpation  RESP: lungs clear to auscultation - no rales, rhonchi or wheezes  CV: regular rate and rhythm, normal S1 S2, no S3 or S4, no murmur, click or rub, no peripheral edema and peripheral pulses strong  SKIN: no suspicious lesions or  rashes  PSYCH: mentation appears normal, affect normal/bright    ASSESSMENT/PLAN:       ICD-10-CM    1. Fear of flying F40.243 ALPRAZolam (XANAX) 0.25 MG tablet   2. Type 2 diabetes mellitus without complication, without long-term current use of insulin (H) E11.9    3. Benign essential hypertension I10      Follow-up in 2 months for DM visit    Follow-up in 2 months    Guzman Hammonds MD  [facility]

## 2019-04-30 NOTE — TELEPHONE ENCOUNTER
Patient does not need the below prescription now, he found the copy in his car and is going to go fill it now.

## 2019-04-30 NOTE — TELEPHONE ENCOUNTER
Patient reports he threw away the hard copy of Xanax and would like a new prescription because he is flying tomorrow.  Unable to check , provider has not added to delegate account.   Please advise   Aurelia Magallanes RN

## 2019-04-30 NOTE — TELEPHONE ENCOUNTER
Reason for Call:  Medication or medication refill:    Do you use a [facility]?  Name of the pharmacy and phone number for the current request:    [facility] [address] [place], [place] - [address] [facility] [address]  [address] [facility] [address]  Phone: [phone] Fax: [phone]      Name of the medication requested: ALPRAZolam (XANAX) 0.25 MG tablet       Other request: Patient threw away original script given, he is flying tomorrow and needs this medication filled today. Please advise.     Can we leave a detailed message on this number? YES    Phone number patient can be reached at: Cell number on file:    Telephone Information:   Mobile [phone]       Best Time: any     Call taken on 4/30/2019 at 11:05 AM by Parrish Hong

## 2019-05-02 ENCOUNTER — TELEPHONE (OUTPATIENT)
Dept: FAMILY MEDICINE | Facility: CLINIC | Age: 44
End: 2019-05-02

## 2019-05-02 NOTE — TELEPHONE ENCOUNTER
Panel Management Review      Patient has the following on his problem list:     Diabetes    ASA: Failed    Last A1C  Lab Results   Component Value Date    A1C 9.4 04/13/2019    A1C 6.9 02/03/2018     A1C tested: FAILED    Last LDL:    Lab Results   Component Value Date    CHOL 241 09/26/2018     Lab Results   Component Value Date    HDL 37 09/26/2018     Lab Results   Component Value Date     09/26/2018     Lab Results   Component Value Date    TRIG 136 09/26/2018     No results found for: CHOLHDLRATIO  Lab Results   Component Value Date    NHDL 204 09/26/2018       Is the patient on a Statin? YES             Is the patient on Aspirin? NO    Medications     HMG CoA Reductase Inhibitors     atorvastatin (LIPITOR) 10 MG tablet             Last three blood pressure readings:  BP Readings from Last 3 Encounters:   04/25/19 124/90   04/18/19 126/76   04/13/19 117/80       Date of last diabetes office visit: 4/18/2019     Tobacco History:     History   Smoking Status     Never Smoker   Smokeless Tobacco     Never Used         Hypertension   Last three blood pressure readings:  BP Readings from Last 3 Encounters:   04/25/19 124/90   04/18/19 126/76   04/13/19 117/80     Blood pressure: Passed    HTN Guidelines:  Less than 140/90      Composite cancer screening  Chart review shows that this patient is due/due soon for the following None  Summary:    Patient is due/failing the following:   PHYSICAL    Action needed:   none    Type of outreach:    none    Questions for provider review:    None                                                                                                                                    VERONICA Danielson       Chart routed to none .

## 2019-05-12 ENCOUNTER — MYC MEDICAL ADVICE (OUTPATIENT)
Dept: FAMILY MEDICINE | Facility: CLINIC | Age: 44
End: 2019-05-12

## 2019-05-12 DIAGNOSIS — E11.9 TYPE 2 DIABETES MELLITUS WITHOUT COMPLICATION, WITHOUT LONG-TERM CURRENT USE OF INSULIN (H): ICD-10-CM

## 2019-05-13 NOTE — TELEPHONE ENCOUNTER
Routing refill request to provider for review/approval because:  Patient would like refill sent to pharmacy now even though he does have current supply.  He would like 90 day so he can transfer to home delivery.   Patient due for visit end of June, is it okay to refill 90 days?  Aurelia Magallanes RN

## 2019-05-14 RX ORDER — METFORMIN HCL 500 MG
2000 TABLET, EXTENDED RELEASE 24 HR ORAL
Qty: 360 TABLET | Refills: 0 | Status: SHIPPED | OUTPATIENT
Start: 2019-05-14 | End: 2019-05-31

## 2019-05-30 DIAGNOSIS — E11.9 TYPE 2 DIABETES MELLITUS WITHOUT COMPLICATION, WITHOUT LONG-TERM CURRENT USE OF INSULIN (H): ICD-10-CM

## 2019-05-31 RX ORDER — METFORMIN HCL 500 MG
2000 TABLET, EXTENDED RELEASE 24 HR ORAL
Qty: 360 TABLET | Refills: 0 | Status: SHIPPED | OUTPATIENT
Start: 2019-05-31 | End: 2019-12-31

## 2019-06-12 ENCOUNTER — TELEPHONE (OUTPATIENT)
Dept: FAMILY MEDICINE | Facility: CLINIC | Age: 44
End: 2019-06-12

## 2019-06-17 ENCOUNTER — TELEPHONE (OUTPATIENT)
Dept: FAMILY MEDICINE | Facility: CLINIC | Age: 44
End: 2019-06-17

## 2019-06-17 NOTE — TELEPHONE ENCOUNTER
Patient would like to schedule a lab appointment prior to office visit scheduled on 6/24/19, please advise on lab orders.   Aurelia Magallanes RN

## 2019-06-17 NOTE — TELEPHONE ENCOUNTER
Patient would like to come in early prior to his appointment for his Blood work (A1C, cholesterol, etc.)  is done. His appointment is 06/24/2019 he would like to come in that Thursday or Friday prior to Monday appointment. He needs a order put in if not one in there now. Thank you

## 2019-06-21 NOTE — PROGRESS NOTES
Subjective     Wilfrid Vickers is a 43 year old male who presents to clinic today for the following health issues:    HPI   Chief Complaint   Patient presents with     Blood Draw     A1C, Cholesterol      Weight Check     Diabetes     Hypertension     Diabetes Follow-up      How often are you checking your blood sugar? Two times daily    What time of day are you checking your blood sugars (select all that apply)?  Before and after meals    Have you had any blood sugars above 200?  No    Have you had any blood sugars below 70?  No    What symptoms do you notice when your blood sugar is low?  None    What concerns do you have today about your diabetes? None     Do you have any of these symptoms? (Select all that apply)  No numbness or tingling in feet.  No redness, sores or blisters on feet.  No complaints of excessive thirst.  No reports of blurry vision.  No significant changes to weight.     Have you had a diabetic eye exam in the last 12 months? No    BP Readings from Last 2 Encounters:   06/24/19 112/82   04/25/19 124/90     Hemoglobin A1C (%)   Date Value   06/24/2019 5.8 (H)   04/13/2019 9.4 (H)     LDL Cholesterol Calculated (mg/dL)   Date Value   09/26/2018 177 (H)   11/28/2017 216 (H)     Diabetes Management Resources  Hypertension Follow-up      Do you check your blood pressure regularly outside of the clinic? No     Are you following a low salt diet? No    Are your blood pressures ever more than 140 on the top number (systolic) OR more   than 90 on the bottom number (diastolic), for example 140/90? No    Amount of exercise or physical activity: 3 days per week 5 miles per day    Problems taking medications regularly: No    Medication side effects: none    Diet: diabetic        BP Readings from Last 3 Encounters:   06/24/19 112/82   04/25/19 124/90   04/18/19 126/76    Wt Readings from Last 3 Encounters:   06/24/19 98.7 kg (217 lb 9.6 oz)   04/25/19 108.4 kg (239 lb)   04/18/19 110.5 kg (243 lb 9.6 oz)         Patient presents for diabetes follow-up visit.  He has lost almost 40lb in the past 2 months.  He has been decreasing his portion sizes as well as processed carbohydrate intake.  He checks his blood glucose daily and fasting has been 90's-110's.  Hes walking daily as well.  Patient has been asymptomatic, taking medications as prescribed.      Wilfrid presents today for HTN follow-up visit.  Currently taking lotrel and has been taking medications as prescribed.  No reported side effects.  Blood pressure ranges are wnl.  Patient has been exercising on a regular basis and is monitoring sodium intake.     Of note, he will be moving in august for [place] as his family lives there.     Reviewed and updated as needed this visit by Provider  Tobacco  Allergies  Meds  Problems  Med Hx  Surg Hx  Fam Hx         Review of Systems   ROS COMP: Constitutional, HEENT, cardiovascular, pulmonary, gi and gu systems are negative, except as otherwise noted.      Objective    /82   Pulse 60   Temp 97.8  F (36.6  C) (Oral)   Resp 16   Wt 98.7 kg (217 lb 9.6 oz)   SpO2 99%   BMI 31.51 kg/m    Body mass index is 31.51 kg/m .  Physical Exam   GENERAL: healthy, alert and no distress  EYES: Eyes grossly normal to inspection, PERRL and conjunctivae and sclerae normal  NECK: no adenopathy, no asymmetry, masses, or scars and thyroid normal to palpation  RESP: lungs clear to auscultation - no rales, rhonchi or wheezes  CV: regular rate and rhythm, normal S1 S2, no S3 or S4, no murmur, click or rub, no peripheral edema and peripheral pulses strong  SKIN: no suspicious lesions or rashes  PSYCH: mentation appears normal, affect normal/bright    Diagnostic Test Results:  Labs reviewed in Epic        Assessment & Plan       ICD-10-CM    1. Type 2 diabetes mellitus without complication, without long-term current use of insulin (H) E11.9 Hemoglobin A1c     Lipid panel reflex to direct LDL Fasting     Comprehensive metabolic  panel   2. Benign essential hypertension I10 Lipid panel reflex to direct LDL Fasting     Comprehensive metabolic panel   3. Hyperlipidemia LDL goal <100 E78.5 Lipid panel reflex to direct LDL Fasting      DM - commended on weight loss and improved A1C, continue metformin for the time being, no refills needed, continue with carb cutting as well as it has been very effective for you.  HTN - well controlled with medications, weight loss, low sodium intake, continue exercising, consider touching base with cardiology to see if you can wean off medication at some point.    Guzman Hammonds MD  [facility]

## 2019-06-21 NOTE — TELEPHONE ENCOUNTER
Called patient and notified him that lab orders are in.  Patient verbalized understanding, no further questions or concerns.    Aurelia Magallanes RN

## 2019-06-24 ENCOUNTER — OFFICE VISIT (OUTPATIENT)
Dept: FAMILY MEDICINE | Facility: CLINIC | Age: 44
End: 2019-06-24
Payer: COMMERCIAL

## 2019-06-24 VITALS
DIASTOLIC BLOOD PRESSURE: 82 MMHG | BODY MASS INDEX: 31.51 KG/M2 | RESPIRATION RATE: 16 BRPM | OXYGEN SATURATION: 99 % | HEART RATE: 60 BPM | WEIGHT: 217.6 LBS | SYSTOLIC BLOOD PRESSURE: 112 MMHG | TEMPERATURE: 97.8 F

## 2019-06-24 DIAGNOSIS — I10 BENIGN ESSENTIAL HYPERTENSION: ICD-10-CM

## 2019-06-24 DIAGNOSIS — E11.9 TYPE 2 DIABETES MELLITUS WITHOUT COMPLICATION, WITHOUT LONG-TERM CURRENT USE OF INSULIN (H): Primary | ICD-10-CM

## 2019-06-24 DIAGNOSIS — E78.5 HYPERLIPIDEMIA LDL GOAL <100: ICD-10-CM

## 2019-06-24 LAB
ALBUMIN SERPL-MCNC: 4.6 G/DL (ref 3.4–5)
ALP SERPL-CCNC: 59 U/L (ref 40–150)
ALT SERPL W P-5'-P-CCNC: 41 U/L (ref 0–70)
ANION GAP SERPL CALCULATED.3IONS-SCNC: 6 MMOL/L (ref 3–14)
AST SERPL W P-5'-P-CCNC: 19 U/L (ref 0–45)
BILIRUB SERPL-MCNC: 0.4 MG/DL (ref 0.2–1.3)
BUN SERPL-MCNC: 13 MG/DL (ref 7–30)
CALCIUM SERPL-MCNC: 8.8 MG/DL (ref 8.5–10.1)
CHLORIDE SERPL-SCNC: 108 MMOL/L (ref 94–109)
CHOLEST SERPL-MCNC: 182 MG/DL
CO2 SERPL-SCNC: 28 MMOL/L (ref 20–32)
CREAT SERPL-MCNC: 0.92 MG/DL (ref 0.66–1.25)
GFR SERPL CREATININE-BSD FRML MDRD: >90 ML/MIN/{1.73_M2}
GLUCOSE SERPL-MCNC: 106 MG/DL (ref 70–99)
HBA1C MFR BLD: 5.8 % (ref 0–5.6)
HDLC SERPL-MCNC: 33 MG/DL
LDLC SERPL CALC-MCNC: 124 MG/DL
NONHDLC SERPL-MCNC: 149 MG/DL
POTASSIUM SERPL-SCNC: 4.1 MMOL/L (ref 3.4–5.3)
PROT SERPL-MCNC: 7.3 G/DL (ref 6.8–8.8)
SODIUM SERPL-SCNC: 142 MMOL/L (ref 133–144)
TRIGL SERPL-MCNC: 124 MG/DL

## 2019-06-24 PROCEDURE — 99214 OFFICE O/P EST MOD 30 MIN: CPT | Performed by: FAMILY MEDICINE

## 2019-06-24 PROCEDURE — 80053 COMPREHEN METABOLIC PANEL: CPT | Performed by: FAMILY MEDICINE

## 2019-06-24 PROCEDURE — 83036 HEMOGLOBIN GLYCOSYLATED A1C: CPT | Performed by: FAMILY MEDICINE

## 2019-06-24 PROCEDURE — 80061 LIPID PANEL: CPT | Performed by: FAMILY MEDICINE

## 2019-06-24 PROCEDURE — 36415 COLL VENOUS BLD VENIPUNCTURE: CPT | Performed by: FAMILY MEDICINE

## 2019-06-24 NOTE — LETTER
[facility]  [address] [place] [address]    June 28, 2019    Wilfrid Vickers  [address] [id number]          Dear Wilfrid,    Your cholesterol is looking a lot better as you said.  It would be nice if you could get your HDL up a little higher tho as this will protect your heart in the future.  To do this, get more fiber, cardio exercise, omega-3's/6's, plant-based protein, etc.  Your kidney and liver function are normal.  As discussed, the drop in your A1C in such a short period of time is unprecedented.  I think you're right about taking a cholesterol-lower medication.  Please take care, and [place]     Enclosed is a copy of your results.     Results for orders placed or performed in visit on 06/24/19   Hemoglobin A1c   Result Value Ref Range    Hemoglobin A1C 5.8 (H) 0 - 5.6 %   Lipid panel reflex to direct LDL Fasting   Result Value Ref Range    Cholesterol 182 <200 mg/dL    Triglycerides 124 <150 mg/dL    HDL Cholesterol 33 (L) >39 mg/dL    LDL Cholesterol Calculated 124 (H) <100 mg/dL    Non HDL Cholesterol 149 (H) <130 mg/dL   Comprehensive metabolic panel   Result Value Ref Range    Sodium 142 133 - 144 mmol/L    Potassium 4.1 3.4 - 5.3 mmol/L    Chloride 108 94 - 109 mmol/L    Carbon Dioxide 28 20 - 32 mmol/L    Anion Gap 6 3 - 14 mmol/L    Glucose 106 (H) 70 - 99 mg/dL    Urea Nitrogen 13 7 - 30 mg/dL    Creatinine 0.92 0.66 - 1.25 mg/dL    GFR Estimate >90 >60 mL/min/[1.73_m2]    GFR Estimate If Black >90 >60 mL/min/[1.73_m2]    Calcium 8.8 8.5 - 10.1 mg/dL    Bilirubin Total 0.4 0.2 - 1.3 mg/dL    Albumin 4.6 3.4 - 5.0 g/dL    Protein Total 7.3 6.8 - 8.8 g/dL    Alkaline Phosphatase 59 40 - 150 U/L    ALT 41 0 - 70 U/L    AST 19 0 - 45 U/L       If you have any questions or concerns, please call myself or my nurse at [phone].      Sincerely,        Guzman Montilla  Klever Walter MD/carr

## 2019-06-27 ENCOUNTER — TELEPHONE (OUTPATIENT)
Dept: CARDIOLOGY | Facility: CLINIC | Age: 44
End: 2019-06-27

## 2019-06-27 NOTE — TELEPHONE ENCOUNTER
Reason for Call:  Other appointment    Detailed comments: See [employer] message-no appointments available-    ----- Message -----   From: Wilfrid Vickers   Sent: 6/26/2019   9:07 AM   To: Cynthia Kabanchik Pool   Subject: Appointment Request                                Appointment Request From: Wilfrid Vickers      With Provider: Manny Subramanian MD [[facility]]      Preferred Date Range: 6/27/2019 - 7/5/2019      Preferred Times: Any time      Reason for visit: Request an Appointment      Comments:   Moving to [place].  Want to have final visit to discuss heart medications, as things have changed a bit for me and I might be a candidate to lower the dosage I'm on.         Phone Number Patient can be reached at: Home number on file [phone] (home)    Best Time: any    Can we leave a detailed message on this number? YES    Call taken on 6/27/2019 at 8:57 AM by Natacha Morocho

## 2019-06-27 NOTE — TELEPHONE ENCOUNTER
"Call to pt; explained Dr Subramanian is out on vacation next week. Offered an appt with Dr West on Friday 7/5/19.   Pt stated \"I really wanted to be seen today, but your wonderful scheduling system put me on hold for 25 minutes\", \"what I really need is for someone to reduce my amlodipine dose, Sri lost a ton of weight and my dose is too high\"   Encouraged pt to see PCP, pt stated his PCP would not reduce the dose and told him to see cardiology.    Again offered pt an appt with Dr West Friday 7/5- pt declined.   "

## 2019-07-01 ENCOUNTER — TELEPHONE (OUTPATIENT)
Dept: CARDIOLOGY | Facility: CLINIC | Age: 44
End: 2019-07-01

## 2019-07-01 NOTE — TELEPHONE ENCOUNTER
Note DR Subramanian Oct. Visit due.        Left message to return clinic call to .  Lay Chávez L.P.N.

## 2019-07-09 ENCOUNTER — TELEPHONE (OUTPATIENT)
Dept: CARDIOLOGY | Facility: CLINIC | Age: 44
End: 2019-07-09

## 2019-07-09 DIAGNOSIS — I10 BENIGN ESSENTIAL HYPERTENSION: Primary | ICD-10-CM

## 2019-07-09 RX ORDER — AMLODIPINE AND BENAZEPRIL HYDROCHLORIDE 5; 10 MG/1; MG/1
1 CAPSULE ORAL DAILY
Qty: 90 CAPSULE | Refills: 3 | Status: SHIPPED | OUTPATIENT
Start: 2019-07-09 | End: 2020-05-13

## 2019-07-09 NOTE — TELEPHONE ENCOUNTER
[facility]    Phone Message    May a detailed message be left on voicemail: yes    Reason for Call: Other: Wilfrid calling to request a call back. He has some questions for Dr. Subramanian. Please call him back to discuss.      Action Taken: Message routed to:  [facility] fk heart

## 2019-07-09 NOTE — TELEPHONE ENCOUNTER
Spoke with pt. Stated he has not moved yet, will be at the end of the month. Reviewed Dr Subramanian's orders pt agreed, verbalized understanding, pharmacy veriifed- script sent.     Pt stated he has also had an increase in PVC's- but no time to attempt to see EP for ablation consult, as PVC burden is not always high. Pt will consult with cardiologist when he moves. Asked if monitor reports could be mailed to his home to hand carry during move.   Reports printed and mailed.

## 2019-07-09 NOTE — TELEPHONE ENCOUNTER
I recommend decreasing the dose of amlodipin/Benazepril to 5-10 mg daily and monitor BP.    DR.CAN

## 2019-09-09 ENCOUNTER — MYC MEDICAL ADVICE (OUTPATIENT)
Dept: FAMILY MEDICINE | Facility: CLINIC | Age: 44
End: 2019-09-09

## 2019-09-27 NOTE — TELEPHONE ENCOUNTER
Panel Management Review      Patient has the following on his problem list: None      Composite cancer screening  Chart review shows that this patient is due/due soon for the following None  Summary:    Patient is due/failing the following:   none    Action needed:   none    Type of outreach:    Phone, spoke to patient.  Patient [place] out of state.    Questions for provider review:    None                                                                                                                                    Mary Anne Aeljandro MA       Chart routed to none .

## 2019-12-30 DIAGNOSIS — E11.9 TYPE 2 DIABETES MELLITUS WITHOUT COMPLICATION, WITHOUT LONG-TERM CURRENT USE OF INSULIN (H): ICD-10-CM

## 2019-12-31 RX ORDER — METFORMIN HCL 500 MG
2000 TABLET, EXTENDED RELEASE 24 HR ORAL
Qty: 360 TABLET | Refills: 0 | Status: SHIPPED | OUTPATIENT
Start: 2019-12-31

## 2020-02-26 ENCOUNTER — TELEPHONE (OUTPATIENT)
Dept: FAMILY MEDICINE | Facility: CLINIC | Age: 45
End: 2020-02-26

## 2020-03-02 ENCOUNTER — HEALTH MAINTENANCE LETTER (OUTPATIENT)
Age: 45
End: 2020-03-02

## 2020-05-08 DIAGNOSIS — I10 BENIGN ESSENTIAL HYPERTENSION: ICD-10-CM

## 2020-05-13 DIAGNOSIS — I10 BENIGN ESSENTIAL HYPERTENSION: ICD-10-CM

## 2020-05-13 RX ORDER — AMLODIPINE AND BENAZEPRIL HYDROCHLORIDE 5; 10 MG/1; MG/1
1 CAPSULE ORAL DAILY
Qty: 90 CAPSULE | Refills: 0 | Status: SHIPPED | OUTPATIENT
Start: 2020-05-13 | End: 2020-08-05

## 2020-05-13 RX ORDER — AMLODIPINE AND BENAZEPRIL HYDROCHLORIDE 5; 10 MG/1; MG/1
1 CAPSULE ORAL DAILY
Qty: 90 CAPSULE | Refills: 0 | Status: CANCELLED | OUTPATIENT
Start: 2020-05-13

## 2020-05-13 NOTE — TELEPHONE ENCOUNTER
Pending Prescriptions:                       Disp   Refills    amLODIPine-benazepril (LOTREL) 5-10 MG ca*90 cap*0            Sig: Take 1 capsule by mouth daily Cardiology visit           needed for future refills    Last Visit 10/31/2018 w/Can  Last Filled 3/14/2020  Quantity 90  Req. Received 5/13/2020  AARTI Bustamante

## 2020-05-13 NOTE — TELEPHONE ENCOUNTER
Rx filled x1 with no refills.  Patient due for visit.  Writer attempted to contact patient, no answer, message left for patient to call and schedule a virtual visit [phone]. Annie Acuña RN

## 2020-05-20 NOTE — MR AVS SNAPSHOT
MRN:[id number]                      After Visit Summary   2/14/2018    Wilfrid Vickers    MRN: [id number]           Visit Information        Provider Department      2/14/2018 9:00 AM Donta Clark [facility]      Your next 10 appointments already scheduled     Aug 06, 2018  9:00 AM CDT   [facility]    [address] [id number] [phone]           Please do not eat 10-12 hours before your appointment if you are coming in fasting for labs on lipids, cholesterol, or glucose (sugar). This does not apply to pregnant women. Water, hot tea and black coffee (with nothing added) are okay. Do not drink other fluids, diet soda or chew gum.            Aug 08, 2018 11:30 AM CDT   Return Visit with Manny Subramanian MD   [facility]    [address] [place] [id number] [phone]              MyChart Information     [facility] gives you secure access to your electronic health record. If you see a primary care provider, you can also send messages to your care team and make appointments. If you have questions, please call your primary care clinic.  If you do not have a primary care provider, please call [phone] and they will assist you.        Care EveryWhere ID     This is your Care EveryWhere ID. This could be used by other organizations to access your [place] medical records  [id number]        Equal Access to Services     PERCY ROMERO : Hadii aad ku hadasho Soomaali, waaxda luqadaha, qaybta kaalmada adeegyada, waxay jose alejandro winn. So [facility] [phone].    ATENCIÓN: Si habla español, tiene a slade disposición servicios gratuitos de asistencia lingüística. Llame al [phone].    We comply with applicable federal civil rights  laws and [place] laws. We do not discriminate on the basis of race, color, national origin, age, disability, sex, sexual orientation, or gender identity.             - - -

## 2020-08-01 DIAGNOSIS — I10 BENIGN ESSENTIAL HYPERTENSION: ICD-10-CM

## 2020-08-05 RX ORDER — AMLODIPINE AND BENAZEPRIL HYDROCHLORIDE 5; 10 MG/1; MG/1
CAPSULE ORAL
Qty: 90 CAPSULE | Refills: 3 | Status: SHIPPED
Start: 2020-08-05

## 2020-08-05 NOTE — TELEPHONE ENCOUNTER
Spoke to patient who reports that he does not live in [place] anymore and the refill request was sent to us in error and he is requesting we deny the refill request.    Annie Acuña RN  Cardiology Care Coordinator  [facility]  [phone] option 1

## 2020-08-16 DIAGNOSIS — E11.9 TYPE 2 DIABETES MELLITUS WITHOUT COMPLICATION, WITHOUT LONG-TERM CURRENT USE OF INSULIN (H): ICD-10-CM

## 2020-08-17 DIAGNOSIS — I10 BENIGN ESSENTIAL HYPERTENSION: ICD-10-CM

## 2020-08-18 RX ORDER — AMLODIPINE AND BENAZEPRIL HYDROCHLORIDE 5; 10 MG/1; MG/1
CAPSULE ORAL
Qty: 90 CAPSULE | Refills: 3 | OUTPATIENT
Start: 2020-08-18

## 2020-08-20 RX ORDER — BLOOD SUGAR DIAGNOSTIC
STRIP MISCELLANEOUS
Qty: 100 STRIP | Refills: 6 | Status: CANCELLED | OUTPATIENT
Start: 2020-08-20

## 2020-08-20 NOTE — TELEPHONE ENCOUNTER
Routing refill request to provider for review/approval because:  Patient needs to be seen because it has been more than 1 year since last office visit.  Out of state pharmacy request    Tiffanie Flores RN  [facility]

## 2020-08-21 RX ORDER — BLOOD SUGAR DIAGNOSTIC
STRIP MISCELLANEOUS
Qty: 100 STRIP | Refills: 0 | Status: SHIPPED | OUTPATIENT
Start: 2020-08-21

## 2020-12-20 ENCOUNTER — HEALTH MAINTENANCE LETTER (OUTPATIENT)
Age: 45
End: 2020-12-20

## 2021-04-18 ENCOUNTER — HEALTH MAINTENANCE LETTER (OUTPATIENT)
Age: 46
End: 2021-04-18
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
## 2021-08-08 ENCOUNTER — HEALTH MAINTENANCE LETTER (OUTPATIENT)
Age: 46
End: 2021-08-08

## 2021-10-03 ENCOUNTER — HEALTH MAINTENANCE LETTER (OUTPATIENT)
Age: 46
End: 2021-10-03

## 2022-03-20 ENCOUNTER — HEALTH MAINTENANCE LETTER (OUTPATIENT)
Age: 47
End: 2022-03-20

## 2022-05-14 ENCOUNTER — HEALTH MAINTENANCE LETTER (OUTPATIENT)
Age: 47
End: 2022-05-14

## 2022-09-04 ENCOUNTER — HEALTH MAINTENANCE LETTER (OUTPATIENT)
Age: 47
End: 2022-09-04

## 2023-01-21 ENCOUNTER — HEALTH MAINTENANCE LETTER (OUTPATIENT)
Age: 48
End: 2023-01-21

## 2023-06-03 ENCOUNTER — HEALTH MAINTENANCE LETTER (OUTPATIENT)
Age: 48
End: 2023-06-03

## 2023-07-23 ENCOUNTER — HEALTH MAINTENANCE LETTER (OUTPATIENT)
Age: 48
End: 2023-07-23

## 2024-02-18 ENCOUNTER — HEALTH MAINTENANCE LETTER (OUTPATIENT)
Age: 49
End: 2024-02-18